# Patient Record
Sex: MALE | Race: WHITE | NOT HISPANIC OR LATINO | Employment: OTHER | URBAN - METROPOLITAN AREA
[De-identification: names, ages, dates, MRNs, and addresses within clinical notes are randomized per-mention and may not be internally consistent; named-entity substitution may affect disease eponyms.]

---

## 2017-01-28 ENCOUNTER — HOSPITAL ENCOUNTER (OUTPATIENT)
Dept: RADIOLOGY | Facility: MEDICAL CENTER | Age: 62
End: 2017-01-28
Attending: PHYSICAL MEDICINE & REHABILITATION
Payer: COMMERCIAL

## 2017-01-28 DIAGNOSIS — M54.16 LUMBAR RADICULOPATHY: ICD-10-CM

## 2017-01-28 DIAGNOSIS — M54.14 THORACIC ROOT LESIONS, NOT ELSEWHERE CLASSIFIED: ICD-10-CM

## 2017-01-28 PROCEDURE — 72148 MRI LUMBAR SPINE W/O DYE: CPT

## 2017-01-28 PROCEDURE — 72146 MRI CHEST SPINE W/O DYE: CPT

## 2017-02-16 RX ORDER — CYCLOBENZAPRINE HCL 10 MG
TABLET ORAL
Qty: 90 TAB | Refills: 0 | Status: SHIPPED | OUTPATIENT
Start: 2017-02-16 | End: 2017-03-15 | Stop reason: SDUPTHER

## 2017-03-15 RX ORDER — CYCLOBENZAPRINE HCL 10 MG
TABLET ORAL
Qty: 90 TAB | Refills: 0 | Status: SHIPPED | OUTPATIENT
Start: 2017-03-15 | End: 2017-04-13 | Stop reason: SDUPTHER

## 2017-04-13 RX ORDER — CYCLOBENZAPRINE HCL 10 MG
TABLET ORAL
Qty: 90 TAB | Refills: 1 | Status: SHIPPED | OUTPATIENT
Start: 2017-04-13 | End: 2017-06-19 | Stop reason: SDUPTHER

## 2017-06-12 RX ORDER — CYCLOBENZAPRINE HCL 10 MG
TABLET ORAL
Qty: 90 TAB | Refills: 0 | Status: SHIPPED | OUTPATIENT
Start: 2017-06-12 | End: 2017-07-08 | Stop reason: SDUPTHER

## 2017-06-19 RX ORDER — CYCLOBENZAPRINE HCL 10 MG
TABLET ORAL
Qty: 90 TAB | Refills: 0 | Status: SHIPPED | OUTPATIENT
Start: 2017-06-19 | End: 2017-07-09

## 2017-06-29 RX ORDER — FLUOXETINE HYDROCHLORIDE 40 MG/1
40 CAPSULE ORAL 2 TIMES DAILY
Qty: 60 CAP | Refills: 5 | Status: SHIPPED | OUTPATIENT
Start: 2017-06-29 | End: 2020-03-09

## 2017-07-09 RX ORDER — CYCLOBENZAPRINE HCL 10 MG
TABLET ORAL
Qty: 90 TAB | Refills: 1 | Status: SHIPPED | OUTPATIENT
Start: 2017-07-09 | End: 2017-08-30 | Stop reason: SDUPTHER

## 2017-11-30 ENCOUNTER — TELEPHONE (OUTPATIENT)
Dept: MEDICAL GROUP | Facility: CLINIC | Age: 62
End: 2017-11-30

## 2017-11-30 DIAGNOSIS — H53.9 VISION CHANGES: ICD-10-CM

## 2017-11-30 NOTE — TELEPHONE ENCOUNTER
VOICEMAIL  1. Caller Name: Domenico Lyle                                       Call Back Number: [phone] (home)           Patient approves a detailed voicemail message: yes    2. SPECIFIC Action To Be Taken: Referral pending, please sign.    3. Diagnosis/Clinical Reason for Request: ANNUAL EXAM    4. Specialty & Provider Name/Lab/Imaging Location: [facility]    5. Is appointment scheduled for requested order/referral: yes - 12/6/17    Patient informed they will receive a return phone call from the office ONLY if there are any questions before processing their request. Advised to call back if they haven't received a call from the referral department in 5 days.

## 2017-12-05 ENCOUNTER — OFFICE VISIT (OUTPATIENT)
Dept: MEDICAL GROUP | Facility: CLINIC | Age: 62
End: 2017-12-05
Payer: COMMERCIAL

## 2017-12-05 VITALS
WEIGHT: 229 LBS | HEART RATE: 89 BPM | OXYGEN SATURATION: 98 % | HEIGHT: 67 IN | RESPIRATION RATE: 16 BRPM | BODY MASS INDEX: 35.94 KG/M2 | TEMPERATURE: 97.2 F | DIASTOLIC BLOOD PRESSURE: 86 MMHG | SYSTOLIC BLOOD PRESSURE: 132 MMHG

## 2017-12-05 DIAGNOSIS — E66.9 OBESITY (BMI 30-39.9): ICD-10-CM

## 2017-12-05 DIAGNOSIS — Z12.5 PROSTATE CANCER SCREENING: ICD-10-CM

## 2017-12-05 DIAGNOSIS — G89.29 CHRONIC BACK PAIN, UNSPECIFIED BACK LOCATION, UNSPECIFIED BACK PAIN LATERALITY: ICD-10-CM

## 2017-12-05 DIAGNOSIS — M25.569 CHRONIC KNEE PAIN, UNSPECIFIED LATERALITY: ICD-10-CM

## 2017-12-05 DIAGNOSIS — R73.01 ELEVATED FASTING BLOOD SUGAR: ICD-10-CM

## 2017-12-05 DIAGNOSIS — M54.9 CHRONIC BACK PAIN, UNSPECIFIED BACK LOCATION, UNSPECIFIED BACK PAIN LATERALITY: ICD-10-CM

## 2017-12-05 DIAGNOSIS — G89.29 CHRONIC KNEE PAIN, UNSPECIFIED LATERALITY: ICD-10-CM

## 2017-12-05 DIAGNOSIS — Z13.220 SCREENING, LIPID: ICD-10-CM

## 2017-12-05 DIAGNOSIS — H53.9 VISION CHANGES: ICD-10-CM

## 2017-12-05 PROCEDURE — 99214 OFFICE O/P EST MOD 30 MIN: CPT | Performed by: NURSE PRACTITIONER

## 2017-12-05 RX ORDER — METHOCARBAMOL 750 MG/1
750 TABLET, FILM COATED ORAL 3 TIMES DAILY
Qty: 90 TAB | Refills: 1 | Status: SHIPPED | OUTPATIENT
Start: 2017-12-05 | End: 2018-02-08 | Stop reason: SDUPTHER

## 2017-12-05 RX ORDER — NAPROXEN 500 MG/1
500 TABLET ORAL
Qty: 60 TAB | Refills: 2 | Status: SHIPPED | OUTPATIENT
Start: 2017-12-05 | End: 2018-03-06 | Stop reason: SDUPTHER

## 2017-12-05 ASSESSMENT — PATIENT HEALTH QUESTIONNAIRE - PHQ9: CLINICAL INTERPRETATION OF PHQ2 SCORE: 0

## 2017-12-06 ENCOUNTER — TELEPHONE (OUTPATIENT)
Dept: MEDICAL GROUP | Facility: CLINIC | Age: 62
End: 2017-12-06

## 2017-12-06 NOTE — PROGRESS NOTES
CC: Office Visit (Medication management; Check right ear because he has had wax build up in the past. Also referral to [facility])        HPI:     Domenico presents today for the followin. Elevated fasting blood sugar  History of mild prediabetes with a family history diabetes. No reports of polys. Weight is increased from before    2. Vision changes  States he has had decreased vision and does not have a routine eye exam. A referral was placed on him last week for office, he already has an appointment    3. Obesity (BMI 30-39.9)  Above ideal weight for height    4. Chronic knee pain, unspecified laterality/Chronic back pain, unspecified back location, unspecified back pain laterality  Does have chronic joint pain and back pain. Was under treatment with Dr. Cooper for several months including injections etc. He finished his treatment and would like to have refills of the muscle relaxer and/or naproxen. Previously was on Soma however to last appointment we didn't help him very much. At that time we switch and Flexeril however today he saying that that is not helping him very much either. Wants to know if it would improve if he took it with naproxen.        Current Outpatient Prescriptions   Medication Sig Dispense Refill   • methocarbamol (ROBAXIN) 750 MG Tab Take 1 Tab by mouth 3 times a day. 90 Tab 1   • naproxen (NAPROSYN) 500 MG Tab Take 1 Tab by mouth 2 times daily with meals as needed. 60 Tab 2   • cyclobenzaprine (FLEXERIL) 10 MG Tab TAKE ONE TABLET BY MOUTH THREE TIMES DAILY AS NEEDED FOR MUSCLE SPASMS/BACK PAIN 90 Tab 1   • fluoxetine (PROZAC) 40 MG capsule Take 1 Cap by mouth 2 times a day. 60 Cap 5   • hydrocodone-acetaminophen (NORCO) 5-325 MG Tab per tablet Take 1-2 Tabs by mouth 2 times a day as needed. 20 Tab 0     No current facility-administered medications for this visit.      Social History   Substance Use Topics   • Smoking status: Never Smoker   • Smokeless tobacco: Never Used   • Alcohol  "use 0.0 oz/week      Comment: 2 beers/year     I reviewed patients allergies, problem list and medications today in [facility].    ROS: Any/all pertinent positives listed in the HPI, otherwise all others reviewed are negative today.      /86   Pulse 89   Temp 36.2 °C (97.2 °F)   Resp 16   Ht 1.702 m (5' 7\")   Wt 103.9 kg (229 lb)   SpO2 98%   BMI 35.87 kg/m²     Exam:    Gen: Alert and oriented, No apparent distress. WDWN  Psych: A+Ox3, normal affect and mood  Skin: Warm, dry and intact. Good turgor   No rashes in visible areas.  Eye: Conjunctiva clear, lids normal  ENMT: Lips without lesions, good dentition  Partial cerumen impaction left ear  Lungs: Clear to auscultation bilaterally, no rales or rhonchi   Unlabored respiratory effort.   CV: Regular rate and rhythm, S1, S2. No murmurs.   No Edema        Assessment and Plan.   62 y.o. male with the following issues.    1. Elevated fasting blood sugar  Labs to monitor are placed  - COMP METABOLIC PANEL; Future  - HEMOGLOBIN A1C; Future    2. Vision changes  Referral is placed and has an appointment    3. Obesity (BMI 30-39.9)  LV we encouraged    4. Chronic knee pain, unspecified laterality  Referral to orthopedics for knee injections  - REFERRAL TO ORTHOPEDICS  - naproxen (NAPROSYN) 500 MG Tab; Take 1 Tab by mouth 2 times daily with meals as needed.  Dispense: 60 Tab; Refill: 2    5. Chronic back pain, unspecified back location, unspecified back pain laterality  Stable. Will trial switch to robaxin. He can use naproxen intermittently however we discussed not using daily and to take with food.  - naproxen (NAPROSYN) 500 MG Tab; Take 1 Tab by mouth 2 times daily with meals as needed.  Dispense: 60 Tab; Refill: 2    6. Screening, lipid  Ordered  - LIPID PROFILE; Future    7. Prostate cancer screening  Ordered  - PROSTATE SPECIFIC AG SCREENING; Future        "

## 2017-12-06 NOTE — TELEPHONE ENCOUNTER
This referral was placed by Dr. Babin last week (as discussed at his visit) and the completed referral information was also printed out and handed to him at his appointment yesterday.    Everything is placed on our end- appears to have been completely processed by the referral department and sent to [facility] on 12/4/17.  We, however, do not have control of [facility]'s office.    I apologize for the inconvenience and we can fax over the information of the referral if needed.      Annie will you call him and/or have Dayanara call him if he desires.

## 2017-12-06 NOTE — TELEPHONE ENCOUNTER
"1. Caller Name: Domenico Lyle                      Call Back Number: [phone] (home)     2. Message: pt called stating he is at the ophthalmologist for his apt today, the referral is not there yet Amarilis was supposed to do it last night when he was here. Pt informed referral was submitted yesterday and waiting for approval it can take 5-7 business days, pt stated now what I will have to pay the visit, informed pt referral has to go through referral department and submit to insurance for approval or denial,  pt got upset stating \"NO Amarilis was supposed to do this a wk ago, have amarilis call me!\" called D/C unable to informed pt Amarilis was not in the office today.     3. Patient approves office to leave a detailed voicemail/MyChart message: yes      "

## 2017-12-19 LAB — HBA1C MFR BLD: 5.4 % (ref ?–5.8)

## 2018-02-08 RX ORDER — METHOCARBAMOL 750 MG/1
TABLET, FILM COATED ORAL
Qty: 90 TAB | Refills: 1 | Status: SHIPPED | OUTPATIENT
Start: 2018-02-08 | End: 2018-04-03 | Stop reason: SDUPTHER

## 2018-03-06 DIAGNOSIS — M25.569 CHRONIC KNEE PAIN, UNSPECIFIED LATERALITY: ICD-10-CM

## 2018-03-06 DIAGNOSIS — G89.29 CHRONIC KNEE PAIN, UNSPECIFIED LATERALITY: ICD-10-CM

## 2018-03-06 DIAGNOSIS — G89.29 CHRONIC BACK PAIN, UNSPECIFIED BACK LOCATION, UNSPECIFIED BACK PAIN LATERALITY: ICD-10-CM

## 2018-03-06 DIAGNOSIS — M54.9 CHRONIC BACK PAIN, UNSPECIFIED BACK LOCATION, UNSPECIFIED BACK PAIN LATERALITY: ICD-10-CM

## 2018-03-06 RX ORDER — NAPROXEN 500 MG/1
TABLET ORAL
Qty: 90 TAB | Refills: 2 | Status: SHIPPED | OUTPATIENT
Start: 2018-03-06 | End: 2018-07-23 | Stop reason: SDUPTHER

## 2018-03-26 ENCOUNTER — OFFICE VISIT (OUTPATIENT)
Dept: MEDICAL GROUP | Facility: CLINIC | Age: 63
End: 2018-03-26
Payer: COMMERCIAL

## 2018-03-26 VITALS
HEART RATE: 98 BPM | OXYGEN SATURATION: 95 % | HEIGHT: 67 IN | BODY MASS INDEX: 35.79 KG/M2 | TEMPERATURE: 97.8 F | WEIGHT: 228 LBS | DIASTOLIC BLOOD PRESSURE: 82 MMHG | RESPIRATION RATE: 16 BRPM | SYSTOLIC BLOOD PRESSURE: 126 MMHG

## 2018-03-26 DIAGNOSIS — H10.9 BACTERIAL CONJUNCTIVITIS OF RIGHT EYE: ICD-10-CM

## 2018-03-26 DIAGNOSIS — R05.9 COUGH: ICD-10-CM

## 2018-03-26 DIAGNOSIS — J02.9 SORE THROAT: ICD-10-CM

## 2018-03-26 LAB
INT CON NEG: NEGATIVE
INT CON POS: POSITIVE
S PYO AG THROAT QL: NEGATIVE

## 2018-03-26 PROCEDURE — 99214 OFFICE O/P EST MOD 30 MIN: CPT | Performed by: NURSE PRACTITIONER

## 2018-03-26 PROCEDURE — 87880 STREP A ASSAY W/OPTIC: CPT | Performed by: NURSE PRACTITIONER

## 2018-03-26 RX ORDER — SULFACETAMIDE SODIUM 100 MG/ML
1 SOLUTION/ DROPS OPHTHALMIC
Qty: 1 BOTTLE | Refills: 0 | Status: SHIPPED | OUTPATIENT
Start: 2018-03-26 | End: 2018-04-02

## 2018-03-26 RX ORDER — BENZONATATE 100 MG/1
100-200 CAPSULE ORAL 3 TIMES DAILY PRN
Qty: 60 CAP | Refills: 0 | Status: SHIPPED | OUTPATIENT
Start: 2018-03-26 | End: 2019-03-25

## 2018-03-26 RX ORDER — CODEINE PHOSPHATE AND GUAIFENESIN 10; 100 MG/5ML; MG/5ML
5 SOLUTION ORAL NIGHTLY PRN
Qty: 75 ML | Refills: 0 | Status: SHIPPED
Start: 2018-03-26 | End: 2018-03-26 | Stop reason: SDUPTHER

## 2018-03-26 RX ORDER — CODEINE PHOSPHATE AND GUAIFENESIN 10; 100 MG/5ML; MG/5ML
5 SOLUTION ORAL NIGHTLY PRN
Qty: 75 ML | Refills: 0 | Status: SHIPPED
Start: 2018-03-26 | End: 2018-04-09

## 2018-03-26 RX ORDER — AZITHROMYCIN 250 MG/1
250 TABLET, FILM COATED ORAL DAILY
Qty: 6 TAB | Refills: 0 | Status: SHIPPED | OUTPATIENT
Start: 2018-03-26 | End: 2018-03-31

## 2018-03-27 NOTE — PROGRESS NOTES
CC: Pharyngitis (x 5 days ago; nasal phlegm clear; cough with green phlegm; feels cold an hot off and on. )        HPI:     Domenico presents today for the followin. Sore throat/Cough  States around 5 days ago he began with a cough which is sometimes dry other times productive. He does bring something up it's typically clear on occasions yellow. Cough is worse when supine. NyQuil is not helping. Associated sore throat. No fevers. Concerned it could be strep throat if he's had this in the past    3. Bacterial conjunctivitis of right eye  States that his right eye has been red and feels like it's tearing off and on during the day. No vision changes. No sensitivity to light. Started around a day or 2 ago. He does wake up in the morning and it's crusty and has use a washcloth to get his eye open    Current Outpatient Prescriptions   Medication Sig Dispense Refill   • azithromycin (ZITHROMAX Z-SUGAR) 250 MG Tab Take 1 Tab by mouth every day for 5 days. Take 2 tabs on day 1 6 Tab 0   • benzonatate (TESSALON) 100 MG Cap Take 1-2 Caps by mouth 3 times a day as needed for Cough. 60 Cap 0   • guaifenesin-codeine (ROBITUSSIN AC) Solution oral solution Take 5 mL by mouth at bedtime as needed for Cough for up to 7 days. 75 mL 0   • sulfacetamide (SULAMYD) 10 % Solution Place 1 Drop in right eye every 3 hours for 7 days. 1 Bottle 0   • naproxen (NAPROSYN) 500 MG Tab TAKE ONE TABLET BY MOUTH TWICE DAILY WITH MEALS AS NEEDED 90 Tab 2   • methocarbamol (ROBAXIN) 750 MG Tab TAKE ONE TABLET BY MOUTH THREE TIMES DAILY 90 Tab 1   • fluoxetine (PROZAC) 40 MG capsule Take 1 Cap by mouth 2 times a day. 60 Cap 5   • hydrocodone-acetaminophen (NORCO) 5-325 MG Tab per tablet Take 1-2 Tabs by mouth 2 times a day as needed. 20 Tab 0     No current facility-administered medications for this visit.      Social History   Substance Use Topics   • Smoking status: Never Smoker   • Smokeless tobacco: Never Used   • Alcohol use 0.0 oz/week       "Comment: 2 beers/year     I reviewed patients allergies, problem list and medications today in [facility].    ROS: Any/all pertinent positives listed in the HPI, otherwise all others reviewed are negative today.      /82   Pulse 98   Temp 36.6 °C (97.8 °F)   Resp 16   Ht 1.702 m (5' 7\")   Wt 103.4 kg (228 lb)   SpO2 95%   BMI 35.71 kg/m²     Exam:    Gen: Alert and oriented, No apparent distress. WDWN  Psych: A+Ox3, normal affect and mood  Skin: Warm, dry and intact. Good turgor   No rashes in visible areas.  Eye: Conjunctiva clear left eye, injected generally around the eye on the right eye. No visible tearing or pus., lids normal  ENMT: Lips without lesions, good dentition   Oropharynx clear. TMs unremarkable bilaterally. No pain with pressure over the frontal or maxillary sinuses bilaterally. Bilateral nasal turbinates appear normal.  Neck: No Lymphadenopathy, Thyromegaly, Bruits.   Trachea midline, no masses  Lungs: Clear to auscultation bilaterally, no rales or rhonchi   Unlabored respiratory effort.   CV: Regular rate and rhythm, S1, S2. No murmurs.   No Edema    - POCT Rapid Strep A  neg    Assessment and Plan.   63 y.o. male with the following issues.    1. Sore throat/Cough  -Stable. Suspect viral however will cover with Z-Sugar given his intermittent productive cough. Discussed in [facility] pot saline rinse. Discussed Tessalon Perles. He has taken and tolerated codeine cough syrup in the past. In the past was very beneficial. Would like again today. He understands not to drive with this medication.   reviewed from state pharmacy database-Medications found to be medically necessary/appropriate.  - POCT Rapid Strep A  - azithromycin (ZITHROMAX Z-SUGAR) 250 MG Tab; Take 1 Tab by mouth every day for 5 days. Take 2 tabs on day 1  Dispense: 6 Tab; Refill: 0  - benzonatate (TESSALON) 100 MG Cap; Take 1-2 Caps by mouth 3 times a day as needed for Cough.  Dispense: 60 Cap; Refill: 0  - guaifenesin-codeine " (ROBITUSSIN AC) Solution oral solution; Take 5 mL by mouth at bedtime as needed for Cough for up to 7 days.  Dispense: 75 mL; Refill: 0    3. Bacterial conjunctivitis of right eye  Stable. Likely viral doses above however will treat with bacterial eyedrops. Discussed contact precautions and how important they are. He does not work contacts  - sulfacetamide (SULAMYD) 10 % Solution; Place 1 Drop in right eye every 3 hours for 7 days.  Dispense: 1 Bottle; Refill: 0

## 2018-04-03 RX ORDER — METHOCARBAMOL 750 MG/1
TABLET, FILM COATED ORAL
Qty: 90 TAB | Refills: 1 | Status: SHIPPED | OUTPATIENT
Start: 2018-04-03 | End: 2018-10-02 | Stop reason: CLARIF

## 2018-06-01 ENCOUNTER — TELEPHONE (OUTPATIENT)
Dept: MEDICAL GROUP | Facility: CLINIC | Age: 63
End: 2018-06-01

## 2018-06-01 RX ORDER — TIZANIDINE 4 MG/1
4 TABLET ORAL 3 TIMES DAILY PRN
Qty: 90 TAB | Refills: 0 | Status: SHIPPED | OUTPATIENT
Start: 2018-06-01 | End: 2018-07-23 | Stop reason: SDUPTHER

## 2018-06-01 NOTE — TELEPHONE ENCOUNTER
1. Caller Name: [facility]                                         Call Back Number: [phone]      Patient approves a detailed voicemail message: N\A    Pt's Methocarbamol is on back order.  Can you please send an alternative?

## 2018-07-23 DIAGNOSIS — M25.569 CHRONIC KNEE PAIN, UNSPECIFIED LATERALITY: ICD-10-CM

## 2018-07-23 DIAGNOSIS — G89.29 CHRONIC KNEE PAIN, UNSPECIFIED LATERALITY: ICD-10-CM

## 2018-07-23 DIAGNOSIS — M54.9 CHRONIC BACK PAIN, UNSPECIFIED BACK LOCATION, UNSPECIFIED BACK PAIN LATERALITY: ICD-10-CM

## 2018-07-23 DIAGNOSIS — G89.29 CHRONIC BACK PAIN, UNSPECIFIED BACK LOCATION, UNSPECIFIED BACK PAIN LATERALITY: ICD-10-CM

## 2018-07-23 RX ORDER — TIZANIDINE 4 MG/1
TABLET ORAL
Qty: 90 TAB | Refills: 0 | Status: SHIPPED | OUTPATIENT
Start: 2018-07-23 | End: 2018-08-26 | Stop reason: SDUPTHER

## 2018-07-23 RX ORDER — NAPROXEN 500 MG/1
TABLET ORAL
Qty: 90 TAB | Refills: 2 | Status: SHIPPED | OUTPATIENT
Start: 2018-07-23 | End: 2018-12-01 | Stop reason: SDUPTHER

## 2018-08-27 RX ORDER — TIZANIDINE 4 MG/1
TABLET ORAL
Qty: 90 TAB | Refills: 0 | Status: SHIPPED | OUTPATIENT
Start: 2018-08-27 | End: 2018-10-02 | Stop reason: SDUPTHER

## 2018-10-02 RX ORDER — TIZANIDINE 4 MG/1
TABLET ORAL
Qty: 90 TAB | Refills: 0 | Status: SHIPPED | OUTPATIENT
Start: 2018-10-02 | End: 2018-11-01 | Stop reason: SDUPTHER

## 2018-10-22 ENCOUNTER — OFFICE VISIT (OUTPATIENT)
Dept: MEDICAL GROUP | Facility: MEDICAL CENTER | Age: 63
End: 2018-10-22
Payer: COMMERCIAL

## 2018-10-22 VITALS
HEIGHT: 67 IN | DIASTOLIC BLOOD PRESSURE: 70 MMHG | BODY MASS INDEX: 36.41 KG/M2 | WEIGHT: 232 LBS | SYSTOLIC BLOOD PRESSURE: 130 MMHG | TEMPERATURE: 97.8 F | HEART RATE: 76 BPM | RESPIRATION RATE: 16 BRPM | OXYGEN SATURATION: 97 %

## 2018-10-22 DIAGNOSIS — Z12.5 SCREENING FOR MALIGNANT NEOPLASM OF PROSTATE: ICD-10-CM

## 2018-10-22 DIAGNOSIS — Z23 NEED FOR VACCINATION: ICD-10-CM

## 2018-10-22 DIAGNOSIS — H53.9 VISION CHANGES: ICD-10-CM

## 2018-10-22 DIAGNOSIS — R73.01 ELEVATED FASTING BLOOD SUGAR: ICD-10-CM

## 2018-10-22 DIAGNOSIS — R10.10 PAIN OF UPPER ABDOMEN: ICD-10-CM

## 2018-10-22 DIAGNOSIS — Z12.11 COLON CANCER SCREENING: ICD-10-CM

## 2018-10-22 DIAGNOSIS — R19.5 CHANGE IN STOOL: ICD-10-CM

## 2018-10-22 DIAGNOSIS — Z13.220 SCREENING, LIPID: ICD-10-CM

## 2018-10-22 PROCEDURE — 90686 IIV4 VACC NO PRSV 0.5 ML IM: CPT | Performed by: NURSE PRACTITIONER

## 2018-10-22 PROCEDURE — 90471 IMMUNIZATION ADMIN: CPT | Performed by: NURSE PRACTITIONER

## 2018-10-22 PROCEDURE — 99214 OFFICE O/P EST MOD 30 MIN: CPT | Mod: 25 | Performed by: NURSE PRACTITIONER

## 2018-10-22 RX ORDER — OMEPRAZOLE 20 MG/1
20 CAPSULE, DELAYED RELEASE ORAL DAILY
Qty: 90 CAP | Refills: 1 | Status: SHIPPED | OUTPATIENT
Start: 2018-10-22 | End: 2019-03-25

## 2018-10-22 ASSESSMENT — PATIENT HEALTH QUESTIONNAIRE - PHQ9: CLINICAL INTERPRETATION OF PHQ2 SCORE: 0

## 2018-10-22 NOTE — PROGRESS NOTES
CC: GI Problem (GI issues lately and request some referral; annual lab work)        HPI:     Domenico presents today for the followin. Elevated fasting blood sugar  NL A1c  Patient has a history of an elevated fasting sugar however historically within normal A1c.  Admits to poor diet.    2. Pain of upper abdomen/ Change in stool   States starting in July he has been having some upper abdominal pain and nausea.  Only threw up on 1 or 2 occasions.  Denies Diarrhea.  Denies black bloody emesis or black stools.  Intermittent.  States he seems to tolerate pretty bland foods like eggs or chicken soup with crackers.  Fatty foods trigger symptoms.    4.  Vision changes  He is followed by Dr. Blum annually.  Has an appointment December and needs referral        Current Outpatient Prescriptions   Medication Sig Dispense Refill   • omeprazole (PRILOSEC) 20 MG delayed-release capsule Take 1 Cap by mouth every day. 90 Cap 1   • tizanidine (ZANAFLEX) 4 MG Tab TAKE 1 TABLET BY MOUTH THREE TIMES DAILY AS NEEDED. DO NOT MIX WITH ANY OTHER MUSCLE RELAXERS OR SEDATING MEDICATIONS 90 Tab 0   • naproxen (NAPROSYN) 500 MG Tab TAKE 1 TABLET BY MOUTH TWICE DAILY WITH MEALS AS NEEDED 90 Tab 2   • benzonatate (TESSALON) 100 MG Cap Take 1-2 Caps by mouth 3 times a day as needed for Cough. 60 Cap 0   • fluoxetine (PROZAC) 40 MG capsule Take 1 Cap by mouth 2 times a day. 60 Cap 5   • hydrocodone-acetaminophen (NORCO) 5-325 MG Tab per tablet Take 1-2 Tabs by mouth 2 times a day as needed. 20 Tab 0     No current facility-administered medications for this visit.      Social History   Substance Use Topics   • Smoking status: Never Smoker   • Smokeless tobacco: Never Used   • Alcohol use 0.0 oz/week      Comment: 2 beers/year     I reviewed patients allergies, problem list and medications today in [facility].    ROS: Any/all pertinent positives listed in the HPI, otherwise all others reviewed are negative today.      /70 (BP Location: Right  "arm, Patient Position: Sitting, BP Cuff Size: Adult)   Pulse 76   Temp 36.6 °C (97.8 °F) (Temporal)   Resp 16   Ht 1.702 m (5' 7\")   Wt 105.2 kg (232 lb)   SpO2 97%   BMI 36.34 kg/m²     Exam:    Gen: Alert and oriented, No apparent distress. WDWN  Psych: A+Ox3, normal affect and mood  Skin: Warm, dry and intact. Good turgor   No rashes in visible areas.  Eye: Conjunctiva clear, lids normal  ENMT: Lips without lesions, good dentition  Lungs: Clear to auscultation bilaterally, no rales or rhonchi   Unlabored respiratory effort.   CV: Regular rate and rhythm, S1, S2. No murmurs.   No Edema  Abd: Soft mild tenderness right upper quadrant.  Otherwise normal non distended. Normal active bowel sounds.    No Hepatosplenomegaly, No pulsatile masses.   Ext: No clubbing, cyanosis, edema.       Assessment and Plan.   63 y.o. male with the following issues.    1. Elevated fasting blood sugar  NL A1c  Stable monitor labs  - COMP METABOLIC PANEL; Future  - HEMOGLOBIN A1C; Future    2. Pain of upper abdomen  Discussed this may be related to gallstones or could be related to gastritis.  He will continue bland diet.  Labs and ultrasound as below.  We will do a trial of omeprazole daily.  - REFERRAL TO GASTROENTEROLOGY  - US-RUQ; Future  - AMYLASE; Future  - LIPASE; Future  - omeprazole (PRILOSEC) 20 MG delayed-release capsule; Take 1 Cap by mouth every day.  Dispense: 90 Cap; Refill: 1    3. Change in stool  Stable.  Referral to gastroenterology  - REFERRAL TO GASTROENTEROLOGY    4. Screening, lipid  Ordered routinely  - LIPID PROFILE; Future    5. Screening for malignant neoplasm of prostate  Ordered routinely  - PROSTATE SPECIFIC AG SCREENING; Future    6. Colon cancer screening  Referral placed  - REFERRAL TO GASTROENTEROLOGY    7. Vision changes  Routine referral placed for his appointment in December  - REFERRAL TO OPHTHALMOLOGY    8. Need for vaccination  I have placed the below orders and discussed them with an " approved delegating provider. The MA is performing the below orders under the direction of an office MD.  -Given today.  - Influenza Vaccine Quad Injection >3Y (PF)

## 2018-10-24 LAB — HBA1C MFR BLD: 5.4 %

## 2018-11-01 ENCOUNTER — HOSPITAL ENCOUNTER (OUTPATIENT)
Dept: RADIOLOGY | Facility: MEDICAL CENTER | Age: 63
End: 2018-11-01
Attending: NURSE PRACTITIONER
Payer: COMMERCIAL

## 2018-11-01 DIAGNOSIS — R10.10 PAIN OF UPPER ABDOMEN: ICD-10-CM

## 2018-11-01 PROCEDURE — 76705 ECHO EXAM OF ABDOMEN: CPT

## 2018-12-01 DIAGNOSIS — M25.569 CHRONIC KNEE PAIN, UNSPECIFIED LATERALITY: ICD-10-CM

## 2018-12-01 DIAGNOSIS — G89.29 CHRONIC BACK PAIN, UNSPECIFIED BACK LOCATION, UNSPECIFIED BACK PAIN LATERALITY: ICD-10-CM

## 2018-12-01 DIAGNOSIS — M54.9 CHRONIC BACK PAIN, UNSPECIFIED BACK LOCATION, UNSPECIFIED BACK PAIN LATERALITY: ICD-10-CM

## 2018-12-01 DIAGNOSIS — G89.29 CHRONIC KNEE PAIN, UNSPECIFIED LATERALITY: ICD-10-CM

## 2018-12-02 RX ORDER — TIZANIDINE 4 MG/1
TABLET ORAL
Qty: 90 TAB | Refills: 1 | Status: SHIPPED | OUTPATIENT
Start: 2018-12-02 | End: 2019-01-26 | Stop reason: SDUPTHER

## 2018-12-02 RX ORDER — NAPROXEN 500 MG/1
TABLET ORAL
Qty: 60 TAB | Refills: 2 | Status: SHIPPED | OUTPATIENT
Start: 2018-12-02 | End: 2019-02-25 | Stop reason: SDUPTHER

## 2018-12-04 ENCOUNTER — HOSPITAL ENCOUNTER (OUTPATIENT)
Dept: RADIOLOGY | Facility: MEDICAL CENTER | Age: 63
End: 2018-12-04
Attending: NURSE PRACTITIONER
Payer: COMMERCIAL

## 2018-12-04 DIAGNOSIS — R10.9 STOMACH ACHE: ICD-10-CM

## 2018-12-04 DIAGNOSIS — R11.0 NAUSEA: ICD-10-CM

## 2018-12-04 PROCEDURE — 74177 CT ABD & PELVIS W/CONTRAST: CPT

## 2018-12-04 PROCEDURE — 700117 HCHG RX CONTRAST REV CODE 255

## 2018-12-04 RX ADMIN — IOHEXOL 50 ML: 240 INJECTION, SOLUTION INTRATHECAL; INTRAVASCULAR; INTRAVENOUS; ORAL at 11:30

## 2018-12-04 RX ADMIN — IOHEXOL 100 ML: 350 INJECTION, SOLUTION INTRAVENOUS at 11:30

## 2019-01-27 RX ORDER — TIZANIDINE 4 MG/1
TABLET ORAL
Qty: 90 TAB | Refills: 1 | Status: SHIPPED | OUTPATIENT
Start: 2019-01-27 | End: 2019-03-27 | Stop reason: SDUPTHER

## 2019-02-25 DIAGNOSIS — M54.9 CHRONIC BACK PAIN, UNSPECIFIED BACK LOCATION, UNSPECIFIED BACK PAIN LATERALITY: ICD-10-CM

## 2019-02-25 DIAGNOSIS — M25.569 CHRONIC KNEE PAIN, UNSPECIFIED LATERALITY: ICD-10-CM

## 2019-02-25 DIAGNOSIS — G89.29 CHRONIC BACK PAIN, UNSPECIFIED BACK LOCATION, UNSPECIFIED BACK PAIN LATERALITY: ICD-10-CM

## 2019-02-25 DIAGNOSIS — G89.29 CHRONIC KNEE PAIN, UNSPECIFIED LATERALITY: ICD-10-CM

## 2019-02-25 RX ORDER — NAPROXEN 500 MG/1
TABLET ORAL
Qty: 60 TAB | Refills: 2 | Status: SHIPPED | OUTPATIENT
Start: 2019-02-25 | End: 2019-03-25

## 2019-03-25 ENCOUNTER — OFFICE VISIT (OUTPATIENT)
Dept: MEDICAL GROUP | Facility: MEDICAL CENTER | Age: 64
End: 2019-03-25
Payer: COMMERCIAL

## 2019-03-25 VITALS
HEART RATE: 89 BPM | OXYGEN SATURATION: 94 % | WEIGHT: 232 LBS | DIASTOLIC BLOOD PRESSURE: 72 MMHG | RESPIRATION RATE: 16 BRPM | BODY MASS INDEX: 36.41 KG/M2 | HEIGHT: 67 IN | TEMPERATURE: 97.9 F | SYSTOLIC BLOOD PRESSURE: 148 MMHG

## 2019-03-25 DIAGNOSIS — M25.512 CHRONIC LEFT SHOULDER PAIN: ICD-10-CM

## 2019-03-25 DIAGNOSIS — Z23 NEED FOR VACCINATION: ICD-10-CM

## 2019-03-25 DIAGNOSIS — R10.10 UPPER ABDOMINAL PAIN: ICD-10-CM

## 2019-03-25 DIAGNOSIS — M25.561 BILATERAL CHRONIC KNEE PAIN: ICD-10-CM

## 2019-03-25 DIAGNOSIS — M25.562 BILATERAL CHRONIC KNEE PAIN: ICD-10-CM

## 2019-03-25 DIAGNOSIS — G89.29 BILATERAL CHRONIC KNEE PAIN: ICD-10-CM

## 2019-03-25 DIAGNOSIS — Z12.11 COLON CANCER SCREENING: ICD-10-CM

## 2019-03-25 DIAGNOSIS — K21.9 GASTROESOPHAGEAL REFLUX DISEASE, ESOPHAGITIS PRESENCE NOT SPECIFIED: ICD-10-CM

## 2019-03-25 DIAGNOSIS — M19.91 PRIMARY OSTEOARTHRITIS, UNSPECIFIED SITE: ICD-10-CM

## 2019-03-25 DIAGNOSIS — G89.29 CHRONIC LEFT SHOULDER PAIN: ICD-10-CM

## 2019-03-25 PROCEDURE — 90471 IMMUNIZATION ADMIN: CPT | Performed by: NURSE PRACTITIONER

## 2019-03-25 PROCEDURE — 99214 OFFICE O/P EST MOD 30 MIN: CPT | Mod: 25 | Performed by: NURSE PRACTITIONER

## 2019-03-25 PROCEDURE — 90715 TDAP VACCINE 7 YRS/> IM: CPT | Performed by: NURSE PRACTITIONER

## 2019-03-25 RX ORDER — SUCRALFATE 1 G/1
TABLET ORAL
COMMUNITY
Start: 2019-02-25 | End: 2020-03-09

## 2019-03-25 RX ORDER — PANTOPRAZOLE SODIUM 40 MG/1
40 TABLET, DELAYED RELEASE ORAL
Qty: 90 TAB | Refills: 3 | Status: SHIPPED | OUTPATIENT
Start: 2019-03-25 | End: 2020-03-09 | Stop reason: SDUPTHER

## 2019-03-25 ASSESSMENT — PATIENT HEALTH QUESTIONNAIRE - PHQ9: CLINICAL INTERPRETATION OF PHQ2 SCORE: 0

## 2019-03-25 NOTE — PROGRESS NOTES
CC: Referral Needed (a couple; go over the stomach doctor visit per patient; )        HPI:     Domenico presents today for the followin. Upper abdominal pain/Gastroesophageal reflux disease, esophagitis presence not specified  Continues to have upper abdominal midline/epigastric pain.  He did see gastroenterology and was placed on sucralfate has not noticed a difference.  Upon discussion today it appears despite is always thinking he was on, he no longer has been taking omeprazole for 6 months or more for an unknown reason.  He never had an adverse reaction to it.  He was given a GERD diet by gastroenterology-no improvement  Drinks 1 soda a day-improved from 10-12-day previously  He does take naproxen 500 mg twice daily for some time now.    He did have a CT scan done [facility] which was normal aside from some renal cysts  Did not pursue colonoscopy as he had difficulty with communication with our office    3. Bilateral chronic knee pain/ Chronic left shoulder pain/Primary osteoarthritis, unspecified site  Requesting referral to see his orthopedic provider back at [facility].  Previously had knee injections and had significant improvement from it would like to go back and consult for injections again        Current Outpatient Prescriptions   Medication Sig Dispense Refill   • pantoprazole (PROTONIX) 40 MG Tablet Delayed Response Take 1 Tab by mouth every morning before breakfast. 90 Tab 3   • sucralfate (CARAFATE) 1 GM Tab      • tizanidine (ZANAFLEX) 4 MG Tab TAKE 1 TABLET BY MOUTH THREE TIMES DAILY AS NEEDED *  DO  NOT  MIX  WITH  ANY  OTHER  MUSCLE  RELAXERS  OR  SEDATING MEDICATIONS** 90 Tab 1   • fluoxetine (PROZAC) 40 MG capsule Take 1 Cap by mouth 2 times a day. 60 Cap 5     No current facility-administered medications for this visit.      Social History   Substance Use Topics   • Smoking status: Never Smoker   • Smokeless tobacco: Never Used   • Alcohol use 0.0  "oz/week      Comment: 2 beers/year     I reviewed patients allergies, problem list and medications today in [facility].    ROS: Any/all pertinent positives listed in the HPI, otherwise all others reviewed are negative today.      /72 (BP Location: Left arm, Patient Position: Sitting, BP Cuff Size: Adult)   Pulse 89   Temp 36.6 °C (97.9 °F) (Temporal)   Resp 16   Ht 1.702 m (5' 7\")   Wt 105.2 kg (232 lb)   SpO2 94%   BMI 36.34 kg/m²     Exam:    Gen: Alert and oriented, No apparent distress. WDWN  Psych: A+Ox3, normal affect and mood  Skin: Warm, dry and intact. Good turgor   No rashes in visible areas.  Eye: Conjunctiva clear, lids normal  ENMT: Lips without lesions, good dentition  Lungs: Clear to auscultation bilaterally, no rales or rhonchi   Unlabored respiratory effort.   CV: Regular rate and rhythm, S1, S2. No murmurs.   No Edema  Manual blood pressure, left arm, sittin/82      Assessment and Plan.   64 y.o. male with the following issues.    1. Upper abdominal pain/ Gastroesophageal reflux disease, esophagitis presence not specified  Chronic and fairly ongoing.  We will stop the naproxen.  Will restart a PPI and I ordered Protonix.  I reviewed how to take it.  We did discuss ideally he would stop soda.  I do however at this point take the naproxen is the bigger offender.  He may continue sucralfate.  We  - REFERRAL TO GASTROENTEROLOGY  - pantoprazole (PROTONIX) 40 MG Tablet Delayed Response; Take 1 Tab by mouth every morning before breakfast.  Dispense: 90 Tab; Refill: 3    3. Bilateral chronic knee pain/Chronic left shoulder pain/Primary osteoarthritis, unspecified site  Chronic conditions.  Referral to orthopedics placed.  As discussed above working to stop naproxen. ,  If quality of life goes down her significant pain he can notify our office and we may consider a small supply of controlled substances to help with his pain at so we are able to avoid naproxen.  Patient verbalized understanding. "  We are hoping when he gets injections in the joints that his pain will be controlled with that only.  - REFERRAL TO ORTHOPEDICS    6. Colon cancer screening  Referral to gastroenterology consultants consult for colonoscopy  - REFERRAL TO GASTROENTEROLOGY    7. Need for vaccination  I have placed the below orders and discussed them with an approved delegating provider. The MA is performing the below orders under the direction of an Office [place].    - Tdap Vaccine =>6YO IM

## 2019-03-27 RX ORDER — TIZANIDINE 4 MG/1
TABLET ORAL
Qty: 90 TAB | Refills: 1 | Status: SHIPPED | OUTPATIENT
Start: 2019-03-27 | End: 2019-05-23 | Stop reason: SDUPTHER

## 2019-05-23 RX ORDER — TIZANIDINE 4 MG/1
TABLET ORAL
Qty: 90 TAB | Refills: 1 | Status: SHIPPED | OUTPATIENT
Start: 2019-05-23 | End: 2019-07-21 | Stop reason: SDUPTHER

## 2019-06-06 ENCOUNTER — TELEPHONE (OUTPATIENT)
Dept: MEDICAL GROUP | Facility: MEDICAL CENTER | Age: 64
End: 2019-06-06

## 2019-06-06 DIAGNOSIS — M54.2 CHRONIC NECK PAIN: ICD-10-CM

## 2019-06-06 DIAGNOSIS — G89.29 BILATERAL CHRONIC KNEE PAIN: ICD-10-CM

## 2019-06-06 DIAGNOSIS — M25.561 BILATERAL CHRONIC KNEE PAIN: ICD-10-CM

## 2019-06-06 DIAGNOSIS — M15.9 PRIMARY OSTEOARTHRITIS INVOLVING MULTIPLE JOINTS: ICD-10-CM

## 2019-06-06 DIAGNOSIS — M25.562 BILATERAL CHRONIC KNEE PAIN: ICD-10-CM

## 2019-06-06 DIAGNOSIS — G89.29 CHRONIC LEFT SHOULDER PAIN: ICD-10-CM

## 2019-06-06 DIAGNOSIS — G89.29 CHRONIC NECK PAIN: ICD-10-CM

## 2019-06-06 DIAGNOSIS — M25.512 CHRONIC LEFT SHOULDER PAIN: ICD-10-CM

## 2019-06-06 NOTE — TELEPHONE ENCOUNTER
VOICEMAIL  1. Caller Name: Domenico                      Call Back Number: [phone] (home)       2. Message: Was taken off the ibuprofen by Maggie but she mentioned short term alternative medication. Having a lot of pain. Surgery scheduled for 6/25/19. Wondering if Maggie can prescribe something?     3. Patient approves office to leave a detailed voicemail/MyChart message: N\A    4. Returned pt's call and left general voicemail.

## 2019-06-07 RX ORDER — TRAMADOL HYDROCHLORIDE 50 MG/1
50-100 TABLET ORAL EVERY 8 HOURS PRN
Qty: 120 TAB | Refills: 0 | Status: SHIPPED | OUTPATIENT
Start: 2019-06-07 | End: 2019-06-10 | Stop reason: SDUPTHER

## 2019-06-07 RX ORDER — TRAMADOL HYDROCHLORIDE 50 MG/1
50-100 TABLET ORAL EVERY 8 HOURS PRN
Qty: 120 TAB | Refills: 0 | Status: SHIPPED
Start: 2019-06-07 | End: 2019-06-07 | Stop reason: SDUPTHER

## 2019-06-07 NOTE — TELEPHONE ENCOUNTER
I can prescribe tramadol or Tylenol with codeine.  I am willing to write a prescription for that but he would need to come by the office to pick that up and will need to sign an opiate consent form.

## 2019-06-07 NOTE — TELEPHONE ENCOUNTER
Please call patient to notify RX at desk and attach the consent for him to sign when he picks up to be added to his chart.  No current need for pain contract as we are, at this point, not anticipating long term scripts.       reviewed from state pharmacy database-Medications found to be medically necessary/appropriate.

## 2019-06-10 NOTE — TELEPHONE ENCOUNTER
1. Caller Name: Domenico                                         Call Back Number: [phone] (home)         Patient approves a detailed voicemail message: yes    [facility] pharmacy would only fill 7 day supply since he has not filled a controlled substance for him. He opted to receive the 7 day supply today but the pharmacy will require a new rx for next week. I told patient that Maggie is out but will be back Monday. Another provider may be able to do the rx but I will make sure to send message to Maggie / notify Maggie on Monday when she is back, 6/17/19.

## 2019-06-10 NOTE — TELEPHONE ENCOUNTER
Routing to Dr. Das because [facility] would only fill 1 week of the tramadol rx since pt has not filled a controlled substance with them before.

## 2019-06-11 RX ORDER — TRAMADOL HYDROCHLORIDE 50 MG/1
50 TABLET ORAL EVERY 6 HOURS PRN
Qty: 35 TAB | Refills: 0 | Status: SHIPPED
Start: 2019-06-11 | End: 2019-06-18

## 2019-06-12 NOTE — TELEPHONE ENCOUNTER
I have written a 7-day prescription today to be filled on June 13.  That will be faxed to [facility] in the morning.

## 2019-06-13 ENCOUNTER — DOCUMENTATION (OUTPATIENT)
Dept: MEDICAL GROUP | Facility: MEDICAL CENTER | Age: 64
End: 2019-06-13

## 2019-06-17 NOTE — TELEPHONE ENCOUNTER
Patient called to find out about the tramadol rx. [facility] has not yet filled today's rx from Dr. Das. I called [facility] and spoke to the pharmacist. There was confusion between Desiree & Dr. Das's rx. I verbally called in the remaining 3 weeks (84 tabs for 21 days = max 4 a day) of the tramadol for patient.   Pt was notified that this was done & to call us if he has any further issues.

## 2019-07-22 RX ORDER — TIZANIDINE 4 MG/1
TABLET ORAL
Qty: 90 TAB | Refills: 1 | Status: SHIPPED | OUTPATIENT
Start: 2019-07-22 | End: 2019-09-16 | Stop reason: SDUPTHER

## 2019-08-27 ENCOUNTER — OFFICE VISIT (OUTPATIENT)
Dept: MEDICAL GROUP | Facility: MEDICAL CENTER | Age: 64
End: 2019-08-27
Payer: COMMERCIAL

## 2019-08-27 VITALS
WEIGHT: 219 LBS | SYSTOLIC BLOOD PRESSURE: 128 MMHG | HEART RATE: 87 BPM | BODY MASS INDEX: 34.37 KG/M2 | RESPIRATION RATE: 16 BRPM | DIASTOLIC BLOOD PRESSURE: 78 MMHG | HEIGHT: 67 IN | OXYGEN SATURATION: 95 % | TEMPERATURE: 97.9 F

## 2019-08-27 DIAGNOSIS — R73.01 ELEVATED FASTING BLOOD SUGAR: ICD-10-CM

## 2019-08-27 DIAGNOSIS — Z13.220 SCREENING, LIPID: ICD-10-CM

## 2019-08-27 DIAGNOSIS — M25.561 BILATERAL CHRONIC KNEE PAIN: ICD-10-CM

## 2019-08-27 DIAGNOSIS — Z11.59 NEED FOR HEPATITIS C SCREENING TEST: ICD-10-CM

## 2019-08-27 DIAGNOSIS — Z12.5 PROSTATE CANCER SCREENING: ICD-10-CM

## 2019-08-27 DIAGNOSIS — Z12.11 COLON CANCER SCREENING: ICD-10-CM

## 2019-08-27 DIAGNOSIS — M25.562 BILATERAL CHRONIC KNEE PAIN: ICD-10-CM

## 2019-08-27 DIAGNOSIS — G89.29 BILATERAL CHRONIC KNEE PAIN: ICD-10-CM

## 2019-08-27 DIAGNOSIS — H53.9 VISION CHANGES: ICD-10-CM

## 2019-08-27 PROCEDURE — 99214 OFFICE O/P EST MOD 30 MIN: CPT | Performed by: NURSE PRACTITIONER

## 2019-08-27 RX ORDER — TRAMADOL HYDROCHLORIDE 50 MG/1
50 TABLET ORAL EVERY 4 HOURS PRN
Qty: 60 TAB | Refills: 5 | Status: SHIPPED
Start: 2019-08-27 | End: 2019-08-27 | Stop reason: SDUPTHER

## 2019-08-27 RX ORDER — TRAMADOL HYDROCHLORIDE 50 MG/1
50 TABLET ORAL 2 TIMES DAILY PRN
Qty: 60 TAB | Refills: 5 | Status: SHIPPED
Start: 2019-08-27 | End: 2019-09-26

## 2019-08-27 NOTE — PROGRESS NOTES
CC: Medication Management (and referrals)        HPI:     Domenico presents today for the followin. Bilateral chronic knee pain  Requesting referral back to orthopedics for bilateral knee injections.  Saw a provider at [facility].  Currently seeing a different provider and is status post left shoulder surgery approximately 3 months ago.  He is currently in physical therapy.  He has been prescribed some opiates and tramadol intermittently by their office.    2. Vision changes  States he is due for his annual follow-up with Dr. Blum and is requesting referral    3. Elevated fasting blood sugar  NL A1c  Needs to have mildly elevated fasting sugar with a normal A1c.  He does have a family history of diabetes.  He has attempted to decrease his soda intake which is been his biggest issue        Current Outpatient Medications   Medication Sig Dispense Refill   • tramadol (ULTRAM) 50 MG Tab Take 1 Tab by mouth 2 times a day as needed for up to 30 days. 60 Tab 5   • tizanidine (ZANAFLEX) 4 MG Tab TAKE 1 TABLET BY MOUTH THREE TIMES DAILY AS NEEDED *DO  NOT  MIX  WITH  OTHER  MUSCLE  RELAXER  OR  SEDATING  MEDICATIONS* 90 Tab 1   • sucralfate (CARAFATE) 1 GM Tab      • pantoprazole (PROTONIX) 40 MG Tablet Delayed Response Take 1 Tab by mouth every morning before breakfast. 90 Tab 3   • fluoxetine (PROZAC) 40 MG capsule Take 1 Cap by mouth 2 times a day. 60 Cap 5     No current facility-administered medications for this visit.      Social History     Tobacco Use   • Smoking status: Never Smoker   • Smokeless tobacco: Never Used   Substance Use Topics   • Alcohol use: Yes     Alcohol/week: 0.0 oz     Comment: 2 beers/year   • Drug use: No     Comment: THC     I reviewed patients allergies, problem list and medications today in [facility].    ROS: Any/all pertinent positives listed in the HPI, otherwise all others reviewed are negative today.      /78 (BP Location: Right arm, Patient Position: Sitting, BP Cuff Size:  "Adult)   Pulse 87   Temp 36.6 °C (97.9 °F) (Temporal)   Resp 16   Ht 1.702 m (5' 7\")   Wt 99.3 kg (219 lb)   SpO2 95%   BMI 34.30 kg/m²     Exam:    Gen: Alert and oriented, No apparent distress. WDWN  Psych: A+Ox3, normal affect and mood  Skin: Warm, dry and intact. Good turgor   No rashes in visible areas.  Eye: Conjunctiva clear, lids normal  ENMT: Lips without lesions, good dentition  Neck: No Lymphadenopathy, Thyromegaly, Bruits.   Trachea midline, no masses  Lungs: Clear to auscultation bilaterally, no rales or rhonchi   Unlabored respiratory effort.   CV: Regular rate and rhythm, S1, S2. No murmurs.   No Edema        Assessment and Plan.   64 y.o. male with the following issues.    1. Bilateral chronic knee pain   reviewed from state pharmacy database-Medications found to be medically necessary/appropriate.  Patient has a consent on file and understands we will just prescribe these from our office not from orthopedics sometimes and sometimes from our office.  He verbalizes understanding.  He understands he is to be seen in 6 months for any further refills.  - REFERRAL TO ORTHOPEDICS  - tramadol (ULTRAM) 50 MG Tab; Take 1 Tab by mouth 2 times a day as needed for up to 30 days.  Dispense: 60 Tab; Refill: 5    2. Vision changes  Furl placed  - REFERRAL TO OPHTHALMOLOGY    3. Elevated fasting blood sugar  NL A1c  Watch diet, due for labs in October  - Comp Metabolic Panel; Future  - HEMOGLOBIN A1C; Future    4. Screening, lipid  Due for labs in October  - Lipid Profile; Future    5. Prostate cancer screening  Ordered  - PROSTATE SPECIFIC AG SCREENING; Future    6. Colon cancer screening  Ordered and explained  - COLOGUARD (FIT DNA)    7. Need for hepatitis C screening test  Ordered  - HEP C VIRUS ANTIBODY; Future          "

## 2019-09-05 ENCOUNTER — TELEPHONE (OUTPATIENT)
Dept: MEDICAL GROUP | Facility: MEDICAL CENTER | Age: 64
End: 2019-09-05

## 2019-09-05 NOTE — TELEPHONE ENCOUNTER
VOICEMAIL: 13:25 pm on 9/5/19  1. Caller Name: [employer]                      Call Back Number: [phone]    2. Message: This is the insurance company. Please call patient at [phone].     3. Returned patient's call at 13:33pm on 9/5/19. Patient said he had been on the phone with insurance and asked them if the stool test would be covered and mentioned that he hadn't gotten a kit yet. I looked @ patient's chart. He was in the office on 8/27/19. Orders would've been faxed. I gave pt the phone number to call [email or web address], [phone]. I also said I will re-fax the order, which I did.      VOICEMAIL: 13:53 pm on 9/5/19  1. Caller Name: Domenico                      Call Back Number: [phone] (home)       2. Message: Just wanting to inform me about something, and to possibly share it with Maggie as well. His insurance called our office and spoke with Sam, the same person who has repeatedly given him wrong info, per the patient. Sam told the [employer] representative that the patient just needs to stop up here at the office and the Cologuard tests are right here, in our office. “He is just full of misinformation”. Will call Lew with the phone number I gave him. There is no need to return his call.

## 2019-09-16 RX ORDER — TIZANIDINE 4 MG/1
TABLET ORAL
Qty: 90 TAB | Refills: 1 | Status: SHIPPED | OUTPATIENT
Start: 2019-09-16 | End: 2019-11-11 | Stop reason: SDUPTHER

## 2019-09-30 NOTE — TELEPHONE ENCOUNTER
Patient hadn't heard from [facility] still. I called today, 9/30/19, and [facility] still does not have the order. I have re-faxed the order.

## 2019-10-21 DIAGNOSIS — G89.29 BILATERAL CHRONIC KNEE PAIN: ICD-10-CM

## 2019-10-21 DIAGNOSIS — M25.561 BILATERAL CHRONIC KNEE PAIN: ICD-10-CM

## 2019-10-21 DIAGNOSIS — M25.562 BILATERAL CHRONIC KNEE PAIN: ICD-10-CM

## 2019-10-23 ENCOUNTER — TELEPHONE (OUTPATIENT)
Dept: MEDICAL GROUP | Facility: MEDICAL CENTER | Age: 64
End: 2019-10-23

## 2019-10-23 NOTE — TELEPHONE ENCOUNTER
1. Caller Name: Domenico                                         Call Back Number: [phone] (home)         Patient approves a detailed voicemail message: yes    Patient was calling to see if he could be seen with Maggie. Reminded him that she is only in on Monday's & Tuesday's. He asked if it was possible to have antibioics/ zpack &/or cough syrup. I apologized but offered him a same day /  appt. He declined. He stated he would wait until Monday. I offered to save an appt on Monday for him, but he declined due to not knowing work schedule.

## 2019-10-28 ENCOUNTER — TELEPHONE (OUTPATIENT)
Dept: MEDICAL GROUP | Facility: MEDICAL CENTER | Age: 64
End: 2019-10-28

## 2019-10-29 NOTE — TELEPHONE ENCOUNTER
Patient needed lab orders reprinted. I faxed them to [facility] in [facility] per his request (fax: [phone]) and mailed copy to go out Tuesday.

## 2019-11-11 RX ORDER — TIZANIDINE 4 MG/1
TABLET ORAL
Qty: 90 TAB | Refills: 1 | Status: SHIPPED
Start: 2019-11-11 | End: 2020-01-13 | Stop reason: SDUPTHER

## 2020-01-13 RX ORDER — TIZANIDINE 4 MG/1
TABLET ORAL
Qty: 90 TAB | Refills: 1 | Status: SHIPPED | OUTPATIENT
Start: 2020-01-13 | End: 2020-03-09 | Stop reason: SDUPTHER

## 2020-02-19 ENCOUNTER — TELEPHONE (OUTPATIENT)
Dept: MEDICAL GROUP | Facility: MEDICAL CENTER | Age: 65
End: 2020-02-19

## 2020-02-19 DIAGNOSIS — M54.2 CERVICAL SPINE PAIN: ICD-10-CM

## 2020-02-19 DIAGNOSIS — M25.562 BILATERAL CHRONIC KNEE PAIN: ICD-10-CM

## 2020-02-19 DIAGNOSIS — G89.29 CHRONIC BACK PAIN, UNSPECIFIED BACK LOCATION, UNSPECIFIED BACK PAIN LATERALITY: ICD-10-CM

## 2020-02-19 DIAGNOSIS — G89.29 BILATERAL CHRONIC KNEE PAIN: ICD-10-CM

## 2020-02-19 DIAGNOSIS — M25.561 BILATERAL CHRONIC KNEE PAIN: ICD-10-CM

## 2020-02-19 DIAGNOSIS — M25.512 CHRONIC LEFT SHOULDER PAIN: ICD-10-CM

## 2020-02-19 DIAGNOSIS — M54.9 CHRONIC BACK PAIN, UNSPECIFIED BACK LOCATION, UNSPECIFIED BACK PAIN LATERALITY: ICD-10-CM

## 2020-02-19 DIAGNOSIS — G89.29 CHRONIC LEFT SHOULDER PAIN: ICD-10-CM

## 2020-02-19 DIAGNOSIS — M54.2 CHRONIC NECK PAIN: ICD-10-CM

## 2020-02-19 DIAGNOSIS — M15.9 PRIMARY OSTEOARTHRITIS INVOLVING MULTIPLE JOINTS: ICD-10-CM

## 2020-02-19 DIAGNOSIS — G89.29 CHRONIC NECK PAIN: ICD-10-CM

## 2020-02-19 RX ORDER — TRAMADOL HYDROCHLORIDE 50 MG/1
TABLET ORAL
Qty: 60 TAB | Refills: 0 | Status: SHIPPED | OUTPATIENT
Start: 2020-02-19 | End: 2020-03-09 | Stop reason: SDUPTHER

## 2020-02-19 NOTE — TELEPHONE ENCOUNTER
PLease call his pharmacy.  I believe he should have one more refill (he was given 6 months worth) however if they do not have record of that he can have one more refill.  Signed into his chart to reflect it. Ok to fill on 2/21/20  Please call patient to remind him he will need to be seen in the next month or so for further refills as it will have been 6 months and that is our policy.             reviewed from state pharmacy database-Medications found to be medically necessary/appropriate.

## 2020-02-19 NOTE — TELEPHONE ENCOUNTER
Patient changed pharmacies in December. When the tramadol rx was transferred, it only gave 120 tabs (or 2 months), which patient has used. Therefore, I called in the 1 month supply and notified patient.

## 2020-03-09 ENCOUNTER — OFFICE VISIT (OUTPATIENT)
Dept: MEDICAL GROUP | Facility: MEDICAL CENTER | Age: 65
End: 2020-03-09
Payer: COMMERCIAL

## 2020-03-09 VITALS
HEART RATE: 102 BPM | HEIGHT: 67 IN | DIASTOLIC BLOOD PRESSURE: 66 MMHG | RESPIRATION RATE: 16 BRPM | BODY MASS INDEX: 35 KG/M2 | WEIGHT: 223 LBS | SYSTOLIC BLOOD PRESSURE: 132 MMHG | OXYGEN SATURATION: 94 % | TEMPERATURE: 97.9 F

## 2020-03-09 DIAGNOSIS — G89.29 CHRONIC BACK PAIN, UNSPECIFIED BACK LOCATION, UNSPECIFIED BACK PAIN LATERALITY: ICD-10-CM

## 2020-03-09 DIAGNOSIS — M25.561 BILATERAL CHRONIC KNEE PAIN: ICD-10-CM

## 2020-03-09 DIAGNOSIS — G89.29 BILATERAL CHRONIC KNEE PAIN: ICD-10-CM

## 2020-03-09 DIAGNOSIS — M15.9 PRIMARY OSTEOARTHRITIS INVOLVING MULTIPLE JOINTS: ICD-10-CM

## 2020-03-09 DIAGNOSIS — M25.562 BILATERAL CHRONIC KNEE PAIN: ICD-10-CM

## 2020-03-09 DIAGNOSIS — M25.512 CHRONIC LEFT SHOULDER PAIN: ICD-10-CM

## 2020-03-09 DIAGNOSIS — K21.9 GASTROESOPHAGEAL REFLUX DISEASE, ESOPHAGITIS PRESENCE NOT SPECIFIED: ICD-10-CM

## 2020-03-09 DIAGNOSIS — M54.9 CHRONIC BACK PAIN, UNSPECIFIED BACK LOCATION, UNSPECIFIED BACK PAIN LATERALITY: ICD-10-CM

## 2020-03-09 DIAGNOSIS — G89.29 CHRONIC LEFT SHOULDER PAIN: ICD-10-CM

## 2020-03-09 DIAGNOSIS — G89.29 CHRONIC NECK PAIN: ICD-10-CM

## 2020-03-09 DIAGNOSIS — M54.2 CERVICAL SPINE PAIN: ICD-10-CM

## 2020-03-09 DIAGNOSIS — M54.2 CHRONIC NECK PAIN: ICD-10-CM

## 2020-03-09 PROCEDURE — 99214 OFFICE O/P EST MOD 30 MIN: CPT | Performed by: NURSE PRACTITIONER

## 2020-03-09 RX ORDER — TIZANIDINE 4 MG/1
TABLET ORAL
Qty: 90 TAB | Refills: 11 | Status: SHIPPED | OUTPATIENT
Start: 2020-03-09 | End: 2021-02-09 | Stop reason: SDUPTHER

## 2020-03-09 RX ORDER — TRAMADOL HYDROCHLORIDE 50 MG/1
TABLET ORAL
Qty: 60 TAB | Refills: 5 | Status: SHIPPED
Start: 2020-03-22 | End: 2020-09-14 | Stop reason: SDUPTHER

## 2020-03-09 RX ORDER — PANTOPRAZOLE SODIUM 40 MG/1
40 TABLET, DELAYED RELEASE ORAL
Qty: 90 TAB | Refills: 3 | Status: SHIPPED | OUTPATIENT
Start: 2020-03-09 | End: 2020-12-14

## 2020-03-09 ASSESSMENT — PATIENT HEALTH QUESTIONNAIRE - PHQ9: CLINICAL INTERPRETATION OF PHQ2 SCORE: 0

## 2020-03-23 ENCOUNTER — TELEPHONE (OUTPATIENT)
Dept: MEDICAL GROUP | Facility: MEDICAL CENTER | Age: 65
End: 2020-03-23

## 2020-03-23 DIAGNOSIS — M25.561 BILATERAL CHRONIC KNEE PAIN: ICD-10-CM

## 2020-03-23 DIAGNOSIS — G89.29 CHRONIC NECK PAIN: ICD-10-CM

## 2020-03-23 DIAGNOSIS — M54.9 CHRONIC BACK PAIN, UNSPECIFIED BACK LOCATION, UNSPECIFIED BACK PAIN LATERALITY: ICD-10-CM

## 2020-03-23 DIAGNOSIS — M54.2 CHRONIC NECK PAIN: ICD-10-CM

## 2020-03-23 DIAGNOSIS — M25.562 BILATERAL CHRONIC KNEE PAIN: ICD-10-CM

## 2020-03-23 DIAGNOSIS — M25.512 CHRONIC LEFT SHOULDER PAIN: ICD-10-CM

## 2020-03-23 DIAGNOSIS — G89.29 CHRONIC LEFT SHOULDER PAIN: ICD-10-CM

## 2020-03-23 DIAGNOSIS — G89.29 BILATERAL CHRONIC KNEE PAIN: ICD-10-CM

## 2020-03-23 DIAGNOSIS — M15.9 PRIMARY OSTEOARTHRITIS INVOLVING MULTIPLE JOINTS: ICD-10-CM

## 2020-03-23 DIAGNOSIS — G89.29 CHRONIC BACK PAIN, UNSPECIFIED BACK LOCATION, UNSPECIFIED BACK PAIN LATERALITY: ICD-10-CM

## 2020-03-23 DIAGNOSIS — M54.2 CERVICAL SPINE PAIN: ICD-10-CM

## 2020-03-23 RX ORDER — TRAMADOL HYDROCHLORIDE 50 MG/1
TABLET ORAL
Qty: 60 TAB | OUTPATIENT
Start: 2020-03-23

## 2020-03-23 NOTE — TELEPHONE ENCOUNTER
This was filled at his last appt.  Please call pharmacy and verify they have this script.  If not, ok to verbally call in per the signed script in his chart

## 2020-03-23 NOTE — TELEPHONE ENCOUNTER
Patient picked the tramadol up yesterday. Pharmacy believes patient may have requested the fill from old rx number, but they did receive our recent rx and it was filled.

## 2020-07-24 ENCOUNTER — TELEPHONE (OUTPATIENT)
Dept: MEDICAL GROUP | Facility: MEDICAL CENTER | Age: 65
End: 2020-07-24

## 2020-07-24 NOTE — TELEPHONE ENCOUNTER
Patient left a voicemail asking for a referral for the [facility]. He wants a foot and ankle doctor. Thinks he hurt his achilles tendon.

## 2020-07-28 NOTE — TELEPHONE ENCOUNTER
Left voicemail for patient requesting call back so that we can find out if it was left or right, and if this was due to an injury.

## 2020-09-14 ENCOUNTER — OFFICE VISIT (OUTPATIENT)
Dept: MEDICAL GROUP | Facility: MEDICAL CENTER | Age: 65
End: 2020-09-14
Payer: COMMERCIAL

## 2020-09-14 VITALS
WEIGHT: 210 LBS | TEMPERATURE: 97.4 F | SYSTOLIC BLOOD PRESSURE: 138 MMHG | OXYGEN SATURATION: 96 % | BODY MASS INDEX: 31.83 KG/M2 | DIASTOLIC BLOOD PRESSURE: 72 MMHG | HEART RATE: 68 BPM | HEIGHT: 68 IN

## 2020-09-14 DIAGNOSIS — M79.641 PAIN IN BOTH HANDS: ICD-10-CM

## 2020-09-14 DIAGNOSIS — M79.642 PAIN IN BOTH HANDS: ICD-10-CM

## 2020-09-14 DIAGNOSIS — G89.29 CHRONIC BACK PAIN, UNSPECIFIED BACK LOCATION, UNSPECIFIED BACK PAIN LATERALITY: ICD-10-CM

## 2020-09-14 DIAGNOSIS — M54.9 CHRONIC BACK PAIN, UNSPECIFIED BACK LOCATION, UNSPECIFIED BACK PAIN LATERALITY: ICD-10-CM

## 2020-09-14 DIAGNOSIS — Z00.00 ROUTINE GENERAL MEDICAL EXAMINATION AT A HEALTH CARE FACILITY: ICD-10-CM

## 2020-09-14 PROCEDURE — 99213 OFFICE O/P EST LOW 20 MIN: CPT | Performed by: NURSE PRACTITIONER

## 2020-09-14 RX ORDER — TRAMADOL HYDROCHLORIDE 50 MG/1
TABLET ORAL
Qty: 60 TAB | Refills: 5 | Status: SHIPPED | OUTPATIENT
Start: 2020-09-14 | End: 2020-10-14

## 2020-09-14 ASSESSMENT — ENCOUNTER SYMPTOMS
NECK PAIN: 1
BACK PAIN: 1

## 2020-09-14 NOTE — PROGRESS NOTES
Subjective:      Domenico Lyle is a 65 y.o. male who presents with Medication Refill        CC: This is a patient of  here for refills on pain medication and referral to orthopedics for his hands.    HPI         1. Chronic back pain, unspecified back location, unspecified back pain laterality  Patient has history of chronic back pain and neck pain for which he takes tramadol twice a day and typically comes in every 6 months for refills on medication.   shows he is not getting medicines from multiple offices and ORT today shows low risk for addiction.  He has tried other medications in the past for pain relief without success.  He denies loss of bowel or bladder function.    2. Pain in both hands  Patient reports history of hand arthritis and he states he has been seen at [facility] in the past and received steroid injections which were helpful.  They are starting to bother him again and he would like a referral back to orthopedics for treatment of his hands.    3. Routine general medical examination at a health care facility  Patient will be due for routine blood work in November.  Past Medical History:   Diagnosis Date   • Anxiety    • Arthritis    • Depression     previously on medication, r/t family issues   • GERD (gastroesophageal reflux disease) 3/25/2019   • Psychiatric disorder     mood     Social History     Socioeconomic History   • Marital status:      Spouse name: Not on file   • Number of children: Not on file   • Years of education: Not on file   • Highest education level: Not on file   Occupational History   • Not on file   Social Needs   • Financial resource strain: Not on file   • Food insecurity     Worry: Not on file     Inability: Not on file   • Transportation needs     Medical: Not on file     Non-medical: Not on file   Tobacco Use   • Smoking status: Never Smoker   • Smokeless tobacco: Never Used   Substance and Sexual Activity   • Alcohol use: Yes     Alcohol/week:  0.0 oz     Comment: 2 beers/year   • Drug use: No     Comment: THC   • Sexual activity: Not Currently   Lifestyle   • Physical activity     Days per week: Not on file     Minutes per session: Not on file   • Stress: Not on file   Relationships   • Social connections     Talks on phone: Not on file     Gets together: Not on file     Attends [employer] service: Not on file     Active member of club or organization: Not on file     Attends meetings of clubs or organizations: Not on file     Relationship status: Not on file   • Intimate partner violence     Fear of current or ex partner: Not on file     Emotionally abused: Not on file     Physically abused: Not on file     Forced sexual activity: Not on file   Other Topics Concern   • Not on file   Social History Narrative   • Not on file     Current Outpatient Medications   Medication Sig Dispense Refill   • tramadol (ULTRAM) 50 MG Tab TAKE 1 TABLET BY MOUTH TWICE DAILY AS NEEDED FOR UP TO 30 DAYS 60 Tab 5   • tizanidine (ZANAFLEX) 4 MG Tab TAKE 1 TABLET BY MOUTH THREE TIMES DAILY AS NEEDED .  DO  NOT  MIX  WITH  OTHER  MUSCLE  RELAXER  OR  SEDATING  MEDICATIONS 90 Tab 11   • pantoprazole (PROTONIX) 40 MG Tablet Delayed Response Take 1 Tab by mouth every morning before breakfast. 90 Tab 3   • VOLTAREN 1 % Gel        No current facility-administered medications for this visit.      Family History   Problem Relation Age of Onset   • Diabetes Mother    • Hypertension Mother    • Heart Disease Mother    • Hyperlipidemia Mother    • Lung Disease Mother         smoker   • Alcohol/Drug Mother         etoh   • Arthritis Mother    • Heart Disease Father    • Hypertension Father    • Hyperlipidemia Father    • Psychiatric Illness Father         azlheimers   • Lung Disease Father         smokers   • Alcohol/Drug Father         etoh   • Diabetes Sister    • Heart Disease Sister    • Hypertension Sister    • Hyperlipidemia Sister    • Arthritis Sister    • Diabetes Brother    •  "Heart Disease Brother    • Hypertension Brother    • Hyperlipidemia Brother    • Alcohol/Drug Brother         etoh   • Lung Disease Brother         smoker   • Arthritis Brother    • Diabetes Brother    • Heart Disease Brother    • Hypertension Brother    • Hyperlipidemia Brother          Review of Systems   Musculoskeletal: Positive for back pain, joint pain and neck pain.   All other systems reviewed and are negative.         Objective:     /72 (BP Location: Right arm, Patient Position: Sitting, BP Cuff Size: Adult)   Pulse 68   Temp 36.3 °C (97.4 °F) (Temporal)   Ht 1.727 m (5' 8\")   Wt 95.3 kg (210 lb)   SpO2 96%   BMI 31.93 kg/m²      Physical Exam  Vitals signs and nursing note reviewed.   Constitutional:       General: He is not in acute distress.     Appearance: He is well-developed. He is not diaphoretic.   HENT:      Head: Normocephalic and atraumatic.      Right Ear: External ear normal.      Left Ear: External ear normal.      Nose: Nose normal.   Eyes:      General:         Right eye: No discharge.         Left eye: No discharge.      Conjunctiva/sclera: Conjunctivae normal.   Neck:      Musculoskeletal: Normal range of motion and neck supple.      Thyroid: No thyromegaly.      Trachea: No tracheal deviation.   Cardiovascular:      Rate and Rhythm: Normal rate and regular rhythm.      Heart sounds: Normal heart sounds. No murmur.   Pulmonary:      Effort: Pulmonary effort is normal. No respiratory distress.      Breath sounds: Normal breath sounds. No wheezing or rales.   Musculoskeletal:      Cervical back: He exhibits pain.      Lumbar back: He exhibits pain.      Comments: No swelling or redness of hand joints noted.   Lymphadenopathy:      Cervical: No cervical adenopathy.   Skin:     General: Skin is warm and dry.      Findings: No erythema or rash.   Neurological:      Mental Status: He is alert and oriented to person, place, and time.      Coordination: Coordination normal. "   Psychiatric:         Behavior: Behavior normal.         Thought Content: Thought content normal.         Judgment: Judgment normal.                 Assessment/Plan:        1. Chronic back pain, unspecified back location, unspecified back pain laterality  ORT completed and  reviewed and I will refill patient's medicines for the next 6 months until he can get back into his PCP.  I advised him to try over-the-counter salon tyson patches to his lower back.  - tramadol (ULTRAM) 50 MG Tab; TAKE 1 TABLET BY MOUTH TWICE DAILY AS NEEDED FOR UP TO 30 DAYS  Dispense: 60 Tab; Refill: 5    2. Pain in both hands  Patient will be referred back to his orthopedic office as requested to look into possibility of steroid injections for the hands.  I told him to try over-the-counter Voltaren gel as well.  - REFERRAL TO ORTHOPEDICS    3. Routine general medical examination at a health care facility  I will send results to patient in November and then he will follow-up with his PCP.  - HEP C VIRUS ANTIBODY; Future  - Comp Metabolic Panel; Future  - Lipid Profile; Future  - PROSTATE SPECIFIC AG SCREENING; Future

## 2020-12-04 ENCOUNTER — TELEPHONE (OUTPATIENT)
Dept: MEDICAL GROUP | Facility: MEDICAL CENTER | Age: 65
End: 2020-12-04

## 2020-12-04 NOTE — TELEPHONE ENCOUNTER
Patient called  He is asking for a referral to be placed to [facility] eye [facility].. pt has an appt 12/21

## 2020-12-07 DIAGNOSIS — H53.9 VISION CHANGES: ICD-10-CM

## 2020-12-14 ENCOUNTER — HOSPITAL ENCOUNTER (OUTPATIENT)
Dept: RADIOLOGY | Facility: MEDICAL CENTER | Age: 65
End: 2020-12-14
Attending: PHYSICIAN ASSISTANT
Payer: COMMERCIAL

## 2020-12-14 ENCOUNTER — OFFICE VISIT (OUTPATIENT)
Dept: URGENT CARE | Facility: PHYSICIAN GROUP | Age: 65
End: 2020-12-14
Payer: COMMERCIAL

## 2020-12-14 VITALS
RESPIRATION RATE: 16 BRPM | HEART RATE: 64 BPM | SYSTOLIC BLOOD PRESSURE: 122 MMHG | BODY MASS INDEX: 33.04 KG/M2 | TEMPERATURE: 98.3 F | OXYGEN SATURATION: 97 % | WEIGHT: 218 LBS | DIASTOLIC BLOOD PRESSURE: 86 MMHG | HEIGHT: 68 IN

## 2020-12-14 DIAGNOSIS — R10.9 RIGHT FLANK PAIN: ICD-10-CM

## 2020-12-14 LAB
APPEARANCE UR: CLEAR
BILIRUB UR STRIP-MCNC: NORMAL MG/DL
COLOR UR AUTO: YELLOW
GLUCOSE UR STRIP.AUTO-MCNC: NORMAL MG/DL
KETONES UR STRIP.AUTO-MCNC: NORMAL MG/DL
LEUKOCYTE ESTERASE UR QL STRIP.AUTO: NORMAL
NITRITE UR QL STRIP.AUTO: NORMAL
PH UR STRIP.AUTO: 6 [PH] (ref 5–8)
PROT UR QL STRIP: NORMAL MG/DL
RBC UR QL AUTO: NORMAL
SP GR UR STRIP.AUTO: 1.03
UROBILINOGEN UR STRIP-MCNC: NORMAL MG/DL

## 2020-12-14 PROCEDURE — 74176 CT ABD & PELVIS W/O CONTRAST: CPT

## 2020-12-14 PROCEDURE — 99214 OFFICE O/P EST MOD 30 MIN: CPT | Performed by: PHYSICIAN ASSISTANT

## 2020-12-14 PROCEDURE — 81002 URINALYSIS NONAUTO W/O SCOPE: CPT | Performed by: PHYSICIAN ASSISTANT

## 2020-12-14 RX ORDER — PREDNISONE 10 MG/1
40 TABLET ORAL DAILY
Qty: 20 TAB | Refills: 0 | Status: SHIPPED | OUTPATIENT
Start: 2020-12-14 | End: 2020-12-19

## 2020-12-14 RX ORDER — TRAMADOL HYDROCHLORIDE 50 MG/1
50 TABLET ORAL EVERY 4 HOURS PRN
COMMUNITY
End: 2021-02-03 | Stop reason: SDUPTHER

## 2020-12-14 ASSESSMENT — ENCOUNTER SYMPTOMS
CONSTIPATION: 0
ABDOMINAL PAIN: 0
DIARRHEA: 0
NAUSEA: 0
COUGH: 0
FLANK PAIN: 1
PALPITATIONS: 0
CHANGE IN BOWEL HABIT: 0
FEVER: 0
CHILLS: 0
SHORTNESS OF BREATH: 0
WHEEZING: 0
VOMITING: 0

## 2020-12-14 NOTE — PROGRESS NOTES
Subjective:   Domenico Lyle is a 65 y.o. male who presents today with   Chief Complaint   Patient presents with   • Flank Pain     right side x 2 days       Other  This is a new problem. The current episode started yesterday. The problem occurs constantly. The problem has been unchanged. Pertinent negatives include no abdominal pain, change in bowel habit, chest pain, chills, coughing, fever, nausea or vomiting. He has tried acetaminophen (Tramadol) for the symptoms. The treatment provided mild relief.     Patient's history is significant for right renal cyst.  No injury or trauma to the area.  PMH:  has a past medical history of Anxiety, Arthritis, Depression, GERD (gastroesophageal reflux disease) (3/25/2019), and Psychiatric disorder. He also has no past medical history of Allergy, unspecified not elsewhere classified, Anemia, Arrhythmia, ASTHMA, Asthma, Asymptomatic human immunodeficiency virus (HIV) infection status ([facility]), Blood transfusion, without reported diagnosis, CAD (coronary artery disease), Cancer ([facility]), CHF (congestive heart failure) ([facility]), Chronic airway obstruction, not elsewhere classified, Clotting disorder ([facility]), Congestive heart failure (HCC), COPD, Diabetes, Diabetic neuropathy ([facility]), Emphysema, Glaucoma, Goiter, Headache(784.0), Headache, classical migraine, Heart attack (HCC), Heart murmur, Hyperlipidemia, Hypertension, IBD (inflammatory bowel disease), Infectious disease, Kidney disease, Liver disease, Meningitis, Renal disorder, Seizure (HCC), Seizure disorder (HCC), Stroke ([facility]), Substance abuse ([facility]), Thyroid disease, Tuberculosis, Type II or unspecified type diabetes mellitus without mention of complication, not stated as uncontrolled, Ulcer, Unspecified cataract, or Urinary tract infection, site not specified.  MEDS:   Current Outpatient Medications:   •  traMADol (ULTRAM) 50 MG Tab, Take 50 mg by mouth every four hours as needed., Disp: , Rfl:   •  predniSONE (DELTASONE) 10  "MG Tab, Take 4 Tabs by mouth every day for 5 days., Disp: 20 Tab, Rfl: 0  •  tizanidine (ZANAFLEX) 4 MG Tab, TAKE 1 TABLET BY MOUTH THREE TIMES DAILY AS NEEDED .  DO  NOT  MIX  WITH  OTHER  MUSCLE  RELAXER  OR  SEDATING  MEDICATIONS, Disp: 90 Tab, Rfl: 11  •  VOLTAREN 1 % Gel, , Disp: , Rfl:   ALLERGIES: No Known Allergies  SURGHX:   Past Surgical History:   Procedure Laterality Date   • EYE CRYOSURGERY  4/29/2009    Performed by VIRAL MENENDEZ at SURGERY SAME DAY [facility]   • LAMINOTOMY  2005    C5-C6 fusion-Dr. Sunshine   • ARTHROSCOPY, KNEE Bilateral 2004    meniscal repair- Dr. Green   • RHINOPLASTY  1990    septial deviation   • UVULOPHARYNGOPALATOPLASTY  1990    SARAH   • BICEPS TENDON REPAIR  1999/2001    fore arms bilateral.  Dr. Osorio   • BICEPS TENDON REPAIR Bilateral     albina   • CIRCUMCISION CHILD     • DENTAL EXTRACTION(S)     • OTHER ORTHOPEDIC SURGERY      c5-6 c6-7   • SHOULDER DECOMPRESSION      Dr. Ortiz   • TONSILLECTOMY AND ADENOIDECTOMY       SOCHX:  reports that he has never smoked. He has never used smokeless tobacco. He reports current alcohol use. He reports that he does not use drugs.  FH: Reviewed with patient, not pertinent to this visit.       Review of Systems   Constitutional: Negative for chills and fever.   Respiratory: Negative for cough, shortness of breath and wheezing.    Cardiovascular: Negative for chest pain and palpitations.   Gastrointestinal: Negative for abdominal pain, change in bowel habit, constipation, diarrhea, nausea and vomiting.   Genitourinary: Positive for flank pain. Negative for dysuria, frequency, hematuria and urgency.   All other systems reviewed and are negative.       Objective:   /86 (BP Location: Left arm, Patient Position: Sitting, BP Cuff Size: Adult)   Pulse 64   Temp 36.8 °C (98.3 °F) (Temporal)   Resp 16   Ht 1.727 m (5' 8\")   Wt 98.9 kg (218 lb)   SpO2 97%   BMI 33.15 kg/m²   Physical Exam  Vitals signs and nursing note " reviewed.   Constitutional:       General: He is not in acute distress.     Appearance: Normal appearance. He is well-developed. He is not ill-appearing or toxic-appearing.   HENT:      Head: Normocephalic and atraumatic.      Right Ear: Hearing normal.      Left Ear: Hearing normal.   Eyes:      Pupils: Pupils are equal, round, and reactive to light.   Cardiovascular:      Rate and Rhythm: Normal rate and regular rhythm.      Heart sounds: Normal heart sounds.   Pulmonary:      Effort: Pulmonary effort is normal.   Abdominal:      Tenderness: There is no abdominal tenderness. There is right CVA tenderness. There is no left CVA tenderness.   Genitourinary:     Comments: Patient deferred  exam  Musculoskeletal:        Back:       Comments: TTP to right flank. No midline spinous process tenderness.  Pain is reproduced with bending, twisting, stretching of the muscles in the thoracic back over the area of the right flank.   Skin:     General: Skin is warm and dry.      Comments: No rash appreciated to the patient's back.   Neurological:      Mental Status: He is alert.      Coordination: Coordination normal.   Psychiatric:         Mood and Affect: Mood normal.         UA NEG  CT  FINDINGS:     Abdomen:  There is no evidence of renal or ureteral stone or evidence of hydronephrosis.  There is no evidence of focal consolidation or pleural effusion within the lung bases.  Bilateral renal cysts are again identified. No follow-up recommended.        Pelvis:  No urinary tract calcifications or evidence of obstruction are identified in the pelvis.  There is no evidence of inflammatory change seen in the region of the bowel.  The appendix appears normal.     IMPRESSION:        No evidence of  urinary tract calculus or hydronephrosis.  No evidence of peritoneal inflammation.  Assessment/Plan:   Assessment    1. Right flank pain  - CT-RENAL COLIC EVALUATION(A/P W/O); Future  - POCT Urinalysis  - predniSONE (DELTASONE) 10 MG  Tab; Take 4 Tabs by mouth every day for 5 days.  Dispense: 20 Tab; Refill: 0    Other orders  - traMADol (ULTRAM) 50 MG Tab; Take 50 mg by mouth every four hours as needed.  Given distribution of pain did discuss potential prodrome of shingles but no rash visualized today.  Patient will call back if any rash recurs.  Will perform CT today for kidney stone rule out.  CT results discussed with patient.  With any worsening pain recommend he go to the ER.  Will treat with steroids today to help reduce inflammation to the area.  Differential includes soft tissue/muscular versus early onset of shingles.  Patient is agreeable with this plan and will closely monitor his symptoms.  Discussed potential side effects of medication with patient he is understanding and agreeable with this plan.  Differential diagnosis, natural history, supportive care, and indications for immediate follow-up discussed.   Patient given instructions and understanding of medications and treatment.    If not improving in 3-5 days, F/U with PCP or return to UC if symptoms worsen.  Strict ER precautions given.  Patient agreeable to plan.      Please note that this dictation was created using voice recognition software. I have made every reasonable attempt to correct obvious errors, but I expect that there are errors of grammar and possibly content that I did not discover before finalizing the note.    Reinier Blake PA-C

## 2020-12-22 ENCOUNTER — TELEPHONE (OUTPATIENT)
Dept: HEALTH INFORMATION MANAGEMENT | Facility: OTHER | Age: 65
End: 2020-12-22

## 2020-12-22 NOTE — TELEPHONE ENCOUNTER
Called [facility].  P: [phone]  Spoke to Bandar ext: [phone]  Informed me referral has not been received.   Will refax to F: [phone].    Scheduled patient for:  Wednesday - 1/13/2021 at 9:30 am with   Dr. Lund. They will verbally confirm appointment a couple days before.   Will f/u to confirm referral is received.    Patient will be informed by Cheyenne franco

## 2020-12-23 NOTE — TELEPHONE ENCOUNTER
Outcome: Left Message    Called pt to follow up and provide appointment information.     Please transfer to Patient Outreach Team at [phone] when patient returns call.

## 2020-12-29 NOTE — TELEPHONE ENCOUNTER
Outcome: Called [facility]. Spoke to Susan in Referrals. Confirmed authorization/referral has been received for visit scheduled on 1/13/2021.

## 2021-02-03 ENCOUNTER — OFFICE VISIT (OUTPATIENT)
Dept: MEDICAL GROUP | Facility: MEDICAL CENTER | Age: 66
End: 2021-02-03
Payer: COMMERCIAL

## 2021-02-03 VITALS
RESPIRATION RATE: 16 BRPM | OXYGEN SATURATION: 97 % | HEART RATE: 74 BPM | DIASTOLIC BLOOD PRESSURE: 72 MMHG | SYSTOLIC BLOOD PRESSURE: 132 MMHG | WEIGHT: 212 LBS | TEMPERATURE: 97.2 F | BODY MASS INDEX: 32.13 KG/M2 | HEIGHT: 68 IN

## 2021-02-03 DIAGNOSIS — R73.01 ELEVATED FASTING BLOOD SUGAR: ICD-10-CM

## 2021-02-03 DIAGNOSIS — G89.29 CHRONIC PAIN OF BOTH KNEES: ICD-10-CM

## 2021-02-03 DIAGNOSIS — M54.50 CHRONIC MIDLINE LOW BACK PAIN WITHOUT SCIATICA: ICD-10-CM

## 2021-02-03 DIAGNOSIS — M25.561 CHRONIC PAIN OF BOTH KNEES: ICD-10-CM

## 2021-02-03 DIAGNOSIS — M25.562 CHRONIC PAIN OF BOTH KNEES: ICD-10-CM

## 2021-02-03 DIAGNOSIS — G89.29 CHRONIC MIDLINE LOW BACK PAIN WITHOUT SCIATICA: ICD-10-CM

## 2021-02-03 DIAGNOSIS — G47.33 OSA (OBSTRUCTIVE SLEEP APNEA): ICD-10-CM

## 2021-02-03 PROCEDURE — 99214 OFFICE O/P EST MOD 30 MIN: CPT | Performed by: NURSE PRACTITIONER

## 2021-02-03 RX ORDER — TRAMADOL HYDROCHLORIDE 50 MG/1
50 TABLET ORAL 2 TIMES DAILY
Qty: 60 TAB | Refills: 5 | Status: SHIPPED | OUTPATIENT
Start: 2021-02-03 | End: 2021-03-05

## 2021-02-03 ASSESSMENT — ENCOUNTER SYMPTOMS
BACK PAIN: 1
INSOMNIA: 1
NECK PAIN: 1

## 2021-02-03 ASSESSMENT — PATIENT HEALTH QUESTIONNAIRE - PHQ9: CLINICAL INTERPRETATION OF PHQ2 SCORE: 0

## 2021-02-03 NOTE — PROGRESS NOTES
Subjective:      Domenico Lyle is a 65 y.o. male who presents with Establish Care and Medication Refill        CC: This is a former patient of Ms. Stanton who last saw her in March of last year and I saw him once in September of last year as a same-day visit but now he is here to establish since Ms. Stanton is no longer practicing.  He is here for follow-up on knee and back pain as well as labs regarding blood sugar and issues with sleep apnea.    HPI       1. Chronic midline low back pain without sciatica  Patient has history of chronic low back pain although recently it was not bothering him as much.  It appears that he had an MR of the lumbar spine in 2017 when he was going to  in physiatry.  It showed degenerative disc disease and moderate bilateral L5 neural foraminal stenosis.  He states recently it has been bothering him more than usual.  He was seen at urgent care for back pain and they did check for renal colic but was negative.  He has been using tramadol and marijuana to treat his symptoms but would be willing to go to pain management to look into other options.  He denies loss of bowel or bladder control or weakness of the extremities.    2. Chronic pain of both knees  Patient states he also has chronic knee pain and was seen at the [facility] over a year ago and had some sort of injections in his knees that would like to get back to them to look at options such as injections again.    3. Elevated fasting blood sugar  NL A1c  Recent blood work showed again showed mild elevation in blood sugar but his A1c readings have been normal.    4. SARAH (obstructive sleep apnea)  Patient reports he has a CPAP at home but has not been using it because he does not like wearing the mask but admits that this would probably help him with his fatigue.  Past Medical History:   Diagnosis Date   • Anxiety    • Arthritis    • Depression     previously on medication, r/t family issues   • GERD  (gastroesophageal reflux disease) 3/25/2019   • Psychiatric disorder     mood     Social History     Socioeconomic History   • Marital status:      Spouse name: Not on file   • Number of children: Not on file   • Years of education: Not on file   • Highest education level: Not on file   Occupational History   • Not on file   Social Needs   • Financial resource strain: Not on file   • Food insecurity     Worry: Not on file     Inability: Not on file   • Transportation needs     Medical: Not on file     Non-medical: Not on file   Tobacco Use   • Smoking status: Never Smoker   • Smokeless tobacco: Never Used   Substance and Sexual Activity   • Alcohol use: Yes     Alcohol/week: 0.0 oz     Comment: 2 beers/year   • Drug use: No     Comment: THC   • Sexual activity: Not Currently   Lifestyle   • Physical activity     Days per week: Not on file     Minutes per session: Not on file   • Stress: Not on file   Relationships   • Social connections     Talks on phone: Not on file     Gets together: Not on file     Attends Buddhist service: Not on file     Active member of club or organization: Not on file     Attends meetings of clubs or organizations: Not on file     Relationship status: Not on file   • Intimate partner violence     Fear of current or ex partner: Not on file     Emotionally abused: Not on file     Physically abused: Not on file     Forced sexual activity: Not on file   Other Topics Concern   • Not on file   Social History Narrative   • Not on file     Current Outpatient Medications   Medication Sig Dispense Refill   • traMADol (ULTRAM) 50 MG Tab Take 1 Tab by mouth 2 Times a Day for 30 days. 60 Tab 5   • tizanidine (ZANAFLEX) 4 MG Tab TAKE 1 TABLET BY MOUTH THREE TIMES DAILY AS NEEDED .  DO  NOT  MIX  WITH  OTHER  MUSCLE  RELAXER  OR  SEDATING  MEDICATIONS 90 Tab 11   • VOLTAREN 1 % Gel        No current facility-administered medications for this visit.      Family History   Problem Relation Age of  "Onset   • Diabetes Mother    • Hypertension Mother    • Heart Disease Mother    • Hyperlipidemia Mother    • Lung Disease Mother         smoker   • Alcohol/Drug Mother         etoh   • Arthritis Mother    • Heart Disease Father    • Hypertension Father    • Hyperlipidemia Father    • Psychiatric Illness Father         azlheimers   • Lung Disease Father         smokers   • Alcohol/Drug Father         etoh   • Diabetes Sister    • Heart Disease Sister    • Hypertension Sister    • Hyperlipidemia Sister    • Arthritis Sister    • Diabetes Brother    • Heart Disease Brother    • Hypertension Brother    • Hyperlipidemia Brother    • Alcohol/Drug Brother         etoh   • Lung Disease Brother         smoker   • Arthritis Brother    • Diabetes Brother    • Heart Disease Brother    • Hypertension Brother    • Hyperlipidemia Brother          Review of Systems   Musculoskeletal: Positive for back pain and neck pain.   Psychiatric/Behavioral: The patient has insomnia.    All other systems reviewed and are negative.         Objective:     /72 (BP Location: Right arm, Patient Position: Sitting, BP Cuff Size: Adult)   Pulse 74   Temp 36.2 °C (97.2 °F) (Temporal)   Resp 16   Ht 1.727 m (5' 8\")   Wt 96.2 kg (212 lb)   SpO2 97%   BMI 32.23 kg/m²      Physical Exam  Vitals signs and nursing note reviewed.   Constitutional:       General: He is not in acute distress.     Appearance: He is well-developed. He is not diaphoretic.   HENT:      Head: Normocephalic and atraumatic.      Right Ear: External ear normal.      Left Ear: External ear normal.      Nose: Nose normal.   Eyes:      General:         Right eye: No discharge.         Left eye: No discharge.      Conjunctiva/sclera: Conjunctivae normal.   Neck:      Musculoskeletal: Normal range of motion and neck supple.      Thyroid: No thyromegaly.      Trachea: No tracheal deviation.   Cardiovascular:      Rate and Rhythm: Normal rate and regular rhythm.      Heart " sounds: Normal heart sounds. No murmur.   Pulmonary:      Effort: Pulmonary effort is normal. No respiratory distress.      Breath sounds: Normal breath sounds. No wheezing or rales.   Musculoskeletal:      Right knee: Tenderness found.      Left knee: Tenderness found.      Cervical back: He exhibits pain.      Lumbar back: He exhibits decreased range of motion and pain.   Lymphadenopathy:      Cervical: No cervical adenopathy.   Skin:     General: Skin is warm and dry.      Findings: No erythema or rash.   Neurological:      Mental Status: He is alert and oriented to person, place, and time.      Coordination: Coordination normal.   Psychiatric:         Behavior: Behavior normal.         Thought Content: Thought content normal.         Judgment: Judgment normal.                 Assessment/Plan:        1. Chronic midline low back pain without sciatica  Patient has previous ORT showing low risk for addiction.  However, I cannot find a recent UDS or drug contract so I will have him sign a contract today and provide a UDS.  He admits that it will show positive for marijuana and I told him that if I am going to prescribe these medicines in the future, he will have to stop marijuana usage.  He also would like to try pain management to look at other options since current treatments are not helping as they should.  He does not show evidence of cauda equina syndrome.  - REFERRAL TO OUTPATIENT INTERVENTIONAL PAIN CLINIC  - Controlled Substance Treatment Agreement  - Pain Management Screen; Future  - traMADol (ULTRAM) 50 MG Tab; Take 1 Tab by mouth 2 Times a Day for 30 days.  Dispense: 60 Tab; Refill: 5    2. Chronic pain of both knees  Patient was referred back to orthopedics per his request and he will continue with Voltaren gel and he also has tramadol which he uses for his back as well.  - REFERRAL TO ORTHOPEDICS  - Pain Management Screen; Future  - traMADol (ULTRAM) 50 MG Tab; Take 1 Tab by mouth 2 Times a Day for 30  days.  Dispense: 60 Tab; Refill: 5    3. Elevated fasting blood sugar  NL A1c  Review of recent lab work shows just mild elevation in blood sugar and otherwise labs look fairly good.    4. SARAH (obstructive sleep apnea)  Patient advised to try different masks and wear his CPAP at night and this will help him with his energy levels.

## 2021-02-09 DIAGNOSIS — G89.29 CHRONIC NECK PAIN: ICD-10-CM

## 2021-02-09 DIAGNOSIS — G89.29 CHRONIC BACK PAIN, UNSPECIFIED BACK LOCATION, UNSPECIFIED BACK PAIN LATERALITY: ICD-10-CM

## 2021-02-09 DIAGNOSIS — M25.562 BILATERAL CHRONIC KNEE PAIN: ICD-10-CM

## 2021-02-09 DIAGNOSIS — M54.9 CHRONIC BACK PAIN, UNSPECIFIED BACK LOCATION, UNSPECIFIED BACK PAIN LATERALITY: ICD-10-CM

## 2021-02-09 DIAGNOSIS — M15.9 PRIMARY OSTEOARTHRITIS INVOLVING MULTIPLE JOINTS: ICD-10-CM

## 2021-02-09 DIAGNOSIS — M54.2 CHRONIC NECK PAIN: ICD-10-CM

## 2021-02-09 DIAGNOSIS — M54.2 CERVICAL SPINE PAIN: ICD-10-CM

## 2021-02-09 DIAGNOSIS — G89.29 CHRONIC LEFT SHOULDER PAIN: ICD-10-CM

## 2021-02-09 DIAGNOSIS — M25.512 CHRONIC LEFT SHOULDER PAIN: ICD-10-CM

## 2021-02-09 DIAGNOSIS — G89.29 BILATERAL CHRONIC KNEE PAIN: ICD-10-CM

## 2021-02-09 DIAGNOSIS — M25.561 BILATERAL CHRONIC KNEE PAIN: ICD-10-CM

## 2021-02-09 RX ORDER — TIZANIDINE 4 MG/1
TABLET ORAL
Qty: 90 TAB | Refills: 11 | Status: SHIPPED | OUTPATIENT
Start: 2021-02-09

## 2021-02-09 NOTE — TELEPHONE ENCOUNTER
Received request via: Pharmacy    Was the patient seen in the last year in this department? Yes    Does the patient have an active prescription (recently filled or refills available) for medication(s) requested? No     Requested Prescriptions     Pending Prescriptions Disp Refills   • tizanidine (ZANAFLEX) 4 MG Tab 90 Tab 11     Sig: TAKE 1 TABLET BY MOUTH THREE TIMES DAILY AS NEEDED .  DO  NOT  MIX  WITH  OTHER  MUSCLE  RELAXER  OR  SEDATING  MEDICATIONS

## 2021-02-18 DIAGNOSIS — G89.29 CHRONIC MIDLINE LOW BACK PAIN WITHOUT SCIATICA: ICD-10-CM

## 2021-02-18 DIAGNOSIS — M54.50 CHRONIC MIDLINE LOW BACK PAIN WITHOUT SCIATICA: ICD-10-CM

## 2021-03-15 DIAGNOSIS — Z23 NEED FOR VACCINATION: ICD-10-CM

## 2021-11-09 NOTE — PROGRESS NOTES
CC: Medication Management (and a couple questions)        HPI:     Domenico presents today for the followin. Cervical spine pain/Chronic neck pain/Bilateral chronic knee pain/ Chronic left shoulder pain/Primary osteoarthritis involving multiple joints/ Chronic back pain, unspecified back location, unspecified back pain laterality  Status post left shoulder surgery.  Most recently about 3 months ago he status post knee surgery.  He is using a topical diclofenac prescribed by orthopedics.  No longer taking oral anti-inflammatories due to his GERD and GI issues.  Last tramadol today.  No adverse reactions with this.    7. Gastroesophageal reflux disease, esophagitis presence not specified  States his stomach is doing a lot better now that he is cut out certain food groups such as milk and dairy products.  Still doing pantoprazole daily.  Needs refills    Current Outpatient Medications   Medication Sig Dispense Refill   • [START ON 3/22/2020] tramadol (ULTRAM) 50 MG Tab TAKE 1 TABLET BY MOUTH TWICE DAILY AS NEEDED FOR UP TO 30 DAYS 60 Tab 5   • tizanidine (ZANAFLEX) 4 MG Tab TAKE 1 TABLET BY MOUTH THREE TIMES DAILY AS NEEDED .  DO  NOT  MIX  WITH  OTHER  MUSCLE  RELAXER  OR  SEDATING  MEDICATIONS 90 Tab 11   • pantoprazole (PROTONIX) 40 MG Tablet Delayed Response Take 1 Tab by mouth every morning before breakfast. 90 Tab 3   • VOLTAREN 1 % Gel        No current facility-administered medications for this visit.      Social History     Tobacco Use   • Smoking status: Never Smoker   • Smokeless tobacco: Never Used   Substance Use Topics   • Alcohol use: Yes     Alcohol/week: 0.0 oz     Comment: 2 beers/year   • Drug use: No     Comment: THC     I reviewed patients allergies, problem list and medications today in [facility].    ROS: Any/all pertinent positives listed in the HPI, otherwise all others reviewed are negative today.      /66 (BP Location: Left arm, Patient Position: Sitting, BP Cuff Size: Adult)   Pulse (!)  I spoke with Julia from [facility]. STI testing was ran on the patient  Two time she will have the patient's account credited. Julia will call the lab to check on RPR result one result is positive  anf the other is negative. Julia will call me back .    I spoke with Julia , charge will be taken off for HIV (it was ran twice)  The patient's RPR is positive and Treponema antibody is still pending.  If Treponema result is positive  The patient will have to come in for treatment.   "102   Temp 36.6 °C (97.9 °F) (Temporal)   Resp 16   Ht 1.702 m (5' 7\")   Wt 101.2 kg (223 lb)   SpO2 94%   BMI 34.93 kg/m²     Exam:    Gen: Alert and oriented, No apparent distress. WDWN  Psych: A+Ox3, normal affect and mood  Skin: Warm, dry and intact. Good turgor   No rashes in visible areas.  Eye: Conjunctiva clear, lids normal  ENMT: Lips without lesions, good dentition  Lungs: Clear to auscultation bilaterally, no rales or rhonchi   Unlabored respiratory effort.   CV: Regular rate and rhythm, S1, S2. No murmurs.   No Edema        Assessment and Plan.   65 y.o. male with the following issues.    1. Cervical spine pain/. Chronic neck pain/Bilateral chronic knee pain/ Chronic left shoulder pain/ Primary osteoarthritis involving multiple joints/ Chronic back pain, unspecified back location, unspecified back pain laterality   reviewed from state pharmacy database-Medications found to be medically necessary/appropriate.  Last dose was today.  No adverse reactions or intolerances.  Understands he needs to be seen every 6 months for refills of tramadol.  - tramadol (ULTRAM) 50 MG Tab; TAKE 1 TABLET BY MOUTH TWICE DAILY AS NEEDED FOR UP TO 30 DAYS  Dispense: 60 Tab; Refill: 5  - tizanidine (ZANAFLEX) 4 MG Tab; TAKE 1 TABLET BY MOUTH THREE TIMES DAILY AS NEEDED .  DO  NOT  MIX  WITH  OTHER  MUSCLE  RELAXER  OR  SEDATING  MEDICATIONS  Dispense: 90 Tab; Refill: 11    7. Gastroesophageal reflux disease, esophagitis presence not specified  Stable with diet changes.  Refill sent  - pantoprazole (PROTONIX) 40 MG Tablet Delayed Response; Take 1 Tab by mouth every morning before breakfast.  Dispense: 90 Tab; Refill: 3            "

## 2023-03-14 NOTE — TELEPHONE ENCOUNTER
"Patient returned my call. I explained that Desiree wasn't in the office & she wouldn't be in next week. I offered to schedule an appt for him with another provider. It has been almost 3 months since his last appt with Desiree where they did discuss possibly having a small supply of narcotics. He has surgery scheduled at end of this month but in a lot of pain and struggling. I told patient I would send message to Desiree and Dr. Das and see if an rx could be written or if he definitively would need the appt I scheduled for him on Monday, 6/10, with Dr. Das.   Per Desiree's note in March, \"As discussed above working to stop naproxen. ,  If quality of life goes down her significant pain he can notify our office and we may consider a small supply of controlled substances to help with his pain at so we are able to avoid naproxen.  Patient verbalized understanding.  We are hoping when he gets injections in the joints that his pain will be controlled with that only\".   " Detail Level: Zone Detail Level: Detailed

## 2023-06-16 PROBLEM — M23.321 MEDIAL MENISCUS, POSTERIOR HORN DERANGEMENT, RIGHT: Status: ACTIVE | Noted: 2023-06-16

## 2023-08-10 ENCOUNTER — OFFICE VISIT (OUTPATIENT)
Dept: VASCULAR SURGERY | Facility: MEDICAL CENTER | Age: 68
End: 2023-08-10
Payer: MEDICARE

## 2023-08-10 VITALS
SYSTOLIC BLOOD PRESSURE: 128 MMHG | WEIGHT: 201 LBS | OXYGEN SATURATION: 97 % | DIASTOLIC BLOOD PRESSURE: 68 MMHG | BODY MASS INDEX: 31.55 KG/M2 | TEMPERATURE: 98.4 F | HEIGHT: 67 IN | HEART RATE: 81 BPM

## 2023-08-10 DIAGNOSIS — I87.2 VENOUS INSUFFICIENCY: ICD-10-CM

## 2023-08-10 PROCEDURE — 3078F DIAST BP <80 MM HG: CPT | Performed by: SURGERY

## 2023-08-10 PROCEDURE — 99203 OFFICE O/P NEW LOW 30 MIN: CPT | Performed by: SURGERY

## 2023-08-10 PROCEDURE — 3074F SYST BP LT 130 MM HG: CPT | Performed by: SURGERY

## 2023-08-10 NOTE — H&P
Vascular Surgery            New Patient Consultation    Patient:Domenico Lyle  MRN:[id number]  Primary care physician:LISSA Wilder  Referring Provider: LISSA Wilder    Vascular Consultant: Jason Delacruz MD    Date: 8/10/2023  _____________________________________________________    Chief Complaint:     Left lower extremity pain and edema    History of Present Illness:   Domenico Lyle  is a 68 y.o. year old male who presents for evaluation of his lower extremity circulation.  The patient has a history of left greater saphenous vein ablation and also stab phlebectomy which was performed in 2015 at [facility].  He has not had any significant venous procedures since then.  He has been noticing over the past month increased pain and swelling around his left ankle and there are some prominent varicose veins in the lower half of his left lower leg.  He also underwent a life screening test which included arterial testing of his lower extremities and this showed noncompressible arteries.  Fortunately he has strong palpable DP and PT pulses in both feet and there is no concern for arterial insufficiency based on his exam.  He has not been wearing compression stockings yet.  He has not had any venous reflux testing since 2015.  There are no wounds on the legs or feet on either side.    Past Medical History:     Past Medical History:   Diagnosis Date    Anxiety     Arthritis     Depression     previously on medication, r/t family issues    GERD (gastroesophageal reflux disease) 3/25/2019    Psychiatric disorder     mood     Past Surgical History:     Past Surgical History:   Procedure Laterality Date    PB KNEE SCOPE,SINGLE MENISECTOMY Right 7/25/2023    Procedure: RIGHT KNEE ARTHROSCOPY, RIGHT PARTIAL MEDIAL MENISCECTOMY, RIGHT PARTIAL LATERAL MENISCECTOMY;  Surgeon: Bladimir Acosta M.D.;  Location: [facility] [place];  Service:  Orthopedics    JOINT INJECTION DIAGNOSTIC Left 7/25/2023    Procedure: INJECTION, JOINT, DIAGNOSTIC;  Surgeon: Bladimir Acosta M.D.;  Location: [facility] [place];  Service: Orthopedics    EYE CRYOSURGERY  4/29/2009    Performed by VIRAL MENENDEZ at SURGERY SAME DAY [facility] ORS    LAMINOTOMY  2005    C5-C6 fusion-Dr. Sunshine    ARTHROSCOPY, KNEE Bilateral 2004    meniscal repair- Dr. Green    RHINOPLASTY  1990    septial deviation    UVULOPHARYNGOPALATOPLASTY  1990    SARAH    BICEPS TENDON REPAIR  1999/2001    fore arms bilateral.  Dr. Osorio    [facility]    CIRCUMCISION CHILD      DENTAL EXTRACTION(S)      OTHER ORTHOPEDIC SURGERY      c5-6 c6-7    SHOULDER DECOMPRESSION      Dr. Ortiz    TONSILLECTOMY AND ADENOIDECTOMY       Allergies:   No Known Allergies  Medications:     Outpatient Encounter Medications as of 8/10/2023   Medication Sig Dispense Refill    meloxicam (MOBIC) 7.5 MG Tab       ondansetron (ZOFRAN) 4 MG Tab tablet       traMADol (ULTRAM) 50 MG Tab       tizanidine (ZANAFLEX) 4 MG Tab TAKE 1 TABLET BY MOUTH THREE TIMES DAILY AS NEEDED .  DO  NOT  MIX  WITH  OTHER  MUSCLE  RELAXER  OR  SEDATING  MEDICATIONS 90 Tab 11     No facility-administered encounter medications on file as of 8/10/2023.     Social History:     Social History     Socioeconomic History    Marital status:      Spouse name: Not on file    Number of children: Not on file    Years of education: Not on file    Highest education level: Not on file   Occupational History    Not on file   Tobacco Use    Smoking status: Never    Smokeless tobacco: Never   Substance and Sexual Activity    Alcohol use: Yes     Alcohol/week: 0.0 oz     Comment: 2 beers/year    Drug use: No     Comment: THC    Sexual activity: Not Currently   Other Topics Concern    Not on file   Social History Narrative    Not on file     Social Determinants of Health     Financial Resource Strain: Not on file    Food Insecurity: Not on file   Transportation Needs: Not on file   Physical Activity: Not on file   Stress: Not on file   Social Connections: Not on file   Intimate Partner Violence: Not on file   Housing Stability: Not on file      Social History     Tobacco Use   Smoking Status Never   Smokeless Tobacco Never     Social History     Substance and Sexual Activity   Alcohol Use Yes    Alcohol/week: 0.0 oz    Comment: 2 beers/year     Social History     Substance and Sexual Activity   Drug Use No    Comment: THC      Family History:     Family History   Problem Relation Age of Onset    Diabetes Mother     Hypertension Mother     Heart Disease Mother     Hyperlipidemia Mother     Lung Disease Mother         smoker    Alcohol/Drug Mother         etoh    Arthritis Mother     Heart Disease Father     Hypertension Father     Hyperlipidemia Father     Psychiatric Illness Father         azlheimers    Lung Disease Father         smokers    Alcohol/Drug Father         etoh    Diabetes Sister     Heart Disease Sister     Hypertension Sister     Hyperlipidemia Sister     Arthritis Sister     Diabetes Brother     Heart Disease Brother     Hypertension Brother     Hyperlipidemia Brother     Alcohol/Drug Brother         etoh    Lung Disease Brother         smoker    Arthritis Brother     Diabetes Brother     Heart Disease Brother     Hypertension Brother     Hyperlipidemia Brother        Review of Systems:   Constitutional: Negative for fever or chills  HENT:   Negative for hearing loss or tinnitus    Eyes:    Negative for blurred vision or loss of vision  Respiratory:  Negative for cough or hemoptysis  Cardiac:  Negative for chest pain or palpitations  Vascular:  Negative for claudication, Negative for rest pain  Gastrointestinal: Negative for vomiting or abdominal pain     Negative for hematochezia or melena   Genitourinary: Negative for dysuria or hematuria   Musculoskeletal: Negative for myalgias or acute joint  "pain  Skin:   Negative for itching or rash  Neurological:  Negative for dizziness or headaches     Negative for speech disturbance     Negative for extremity weakness or paresthesias  Endo/Heme:  Negative for easy bruising or bleeding       Exam:   /68 (BP Location: Left arm, Patient Position: Sitting, BP Cuff Size: Adult)   Pulse 81   Temp 36.9 °C (98.4 °F) (Temporal)   Ht 1.702 m (5' 7\")   Wt 91.2 kg (201 lb)   SpO2 97%   BMI 31.48 kg/m²     Constitutional: Alert, oriented, no acute distress  HEENT:  Normocephalic and atraumatic, EOMI  Neck:   Supple, no JVD,   Cardiovascular: Regular rate and rhythm,   Pulmonary:  Good air entry bilaterally,    Abdominal:  Soft, non-tender, non-distended  Musculoskeletal: No tenderness, no deformity  Neurological:  CN II-XII grossly intact, no focal deficits  Skin:   Skin is warm and dry. No rash noted.  Vascular exam:    RUE: warm, cap refill<3 seconds, no edema, no tissue loss,   LUE: warm, cap refill<3 seconds, no edema, no tissue loss,   RLE: warm, cap refill<3 seconds, no edema, no tissue loss, Palpable DP pulse  LLE: warm, cap refill<3 seconds, mild edema of the lower leg and prominent varicose veins along the medial aspect of the lower leg, no tissue loss, Palpable DP pulse      Imaging:   Life screen BISHOP showed vessels are noncompressible      Assessment and Plan:   - Chronic venous insufficiency of the left lower extremity with associated pain and swelling and prominent varicose veins    I recommended the patient start wearing a knee-high compression stocking daily during the day while he is up and active.  He does not need to wear it at night.  We will also check an updated venous reflux study of his left lower extremity, in particular I am looking for deep vein reflux in order to determine the severity of his venous insufficiency and I will call him with those results once they are available.  At this point in time I doubt that the patient will need " surgical intervention for his left lower extremity varicose veins but if the symptoms continue to get worse, if the veins get more prominent, or if the ultrasound shows findings that warrant surgical intervention we will discuss it.      _____________________________________________________  Jason Delacruz MD  [facility]  [phone]  [address] [facility] [address], [place] [address]

## 2023-10-30 ENCOUNTER — HOSPITAL ENCOUNTER (OUTPATIENT)
Dept: RADIOLOGY | Facility: MEDICAL CENTER | Age: 68
End: 2023-10-30
Attending: SURGERY
Payer: MEDICARE

## 2023-10-30 DIAGNOSIS — I87.2 VENOUS INSUFFICIENCY: ICD-10-CM

## 2023-10-30 PROCEDURE — 93971 EXTREMITY STUDY: CPT | Mod: 26,LT | Performed by: INTERNAL MEDICINE

## 2023-10-30 PROCEDURE — 93971 EXTREMITY STUDY: CPT | Mod: LT

## 2025-03-27 ENCOUNTER — HOSPITAL ENCOUNTER (OUTPATIENT)
Dept: RADIOLOGY | Facility: MEDICAL CENTER | Age: 70
End: 2025-03-27